# Patient Record
Sex: FEMALE | Race: BLACK OR AFRICAN AMERICAN | NOT HISPANIC OR LATINO | Employment: FULL TIME | URBAN - METROPOLITAN AREA
[De-identification: names, ages, dates, MRNs, and addresses within clinical notes are randomized per-mention and may not be internally consistent; named-entity substitution may affect disease eponyms.]

---

## 2024-04-24 DIAGNOSIS — J32.9 CHRONIC SINUSITIS: Primary | ICD-10-CM

## 2024-04-29 ENCOUNTER — OFFICE VISIT (OUTPATIENT)
Dept: OTOLARYNGOLOGY | Facility: CLINIC | Age: 39
End: 2024-04-29
Payer: COMMERCIAL

## 2024-04-29 DIAGNOSIS — R44.8 FACIAL PRESSURE: ICD-10-CM

## 2024-04-29 DIAGNOSIS — R09.82 PND (POST-NASAL DRIP): ICD-10-CM

## 2024-04-29 DIAGNOSIS — J30.9 ALLERGIC RHINITIS, UNSPECIFIED SEASONALITY, UNSPECIFIED TRIGGER: Primary | ICD-10-CM

## 2024-04-29 PROCEDURE — 99442 PR PHYSICIAN TELEPHONE EVALUATION 11-20 MIN: CPT | Mod: 95,,, | Performed by: NURSE PRACTITIONER

## 2024-04-29 PROCEDURE — 1159F MED LIST DOCD IN RCRD: CPT | Mod: CPTII,95,, | Performed by: NURSE PRACTITIONER

## 2024-04-29 RX ORDER — FLUTICASONE PROPIONATE 50 MCG
2 SPRAY, SUSPENSION (ML) NASAL 2 TIMES DAILY
Qty: 16 G | Refills: 11 | Status: SHIPPED | OUTPATIENT
Start: 2024-04-29 | End: 2024-05-29

## 2024-04-29 RX ORDER — FLUTICASONE PROPIONATE 50 MCG
2 SPRAY, SUSPENSION (ML) NASAL 2 TIMES DAILY
Qty: 16 G | Refills: 11 | Status: SHIPPED | OUTPATIENT
Start: 2024-04-29 | End: 2024-04-29 | Stop reason: SDUPTHER

## 2024-04-29 RX ORDER — AZELASTINE 1 MG/ML
1 SPRAY, METERED NASAL 2 TIMES DAILY
Qty: 30 ML | Refills: 5 | Status: SHIPPED | OUTPATIENT
Start: 2024-04-29 | End: 2025-04-29

## 2024-04-29 NOTE — PROGRESS NOTES
Audio Only Telehealth Visit     The patient location is: [facility]  The chief complaint leading to consultation is: sinus concerns  Visit type: Virtual visit with audio only (telephone)  Total time spent on visit: 20 min     The reason for the audio only service rather than synchronous audio and video virtual visit was related to technical difficulties or patient preference/necessity.     Each patient to whom I provide medical services by telemedicine is:  (1) informed of the relationship between the physician and patient and the respective role of any other health care provider with respect to management of the patient; and (2) notified that they may decline to receive medical services by telemedicine and may withdraw from such care at any time. Patient verbally consented to receive this service via voice-only telephone call.       HPI: 04/29/2024: 38 y.o. female reports having a sinus infection every month for the past 3-4 months. Denies nasal congestion. Endorses rhinorrhea and PND, pressure periorbitally and around her nose, sneezing, and itchy/watery eyes. Denies hyposmia. She has been on abx once a month for this which have not helped symptoms. Has not done nasal rinses or sprays. Has tried OTC allergy meds including xyzal and benadryl with minimal success.      Assessment and plan:  38 y.o. female with AR, ?CRS. Not medically optimized.  - NSI BID  - Follow with flonase + astelin BID (discussed technique and that astelin is available OTC if ins denies)  - RTC 6 weeks     Guerline Leija NP      This service was not originating from a related E/M service provided within the previous 7 days nor will  to an E/M service or procedure within the next 24 hours or my soonest available appointment.  Prevailing standard of care was able to be met in this audio-only visit.

## 2024-08-28 ENCOUNTER — HOSPITAL ENCOUNTER (EMERGENCY)
Facility: HOSPITAL | Age: 39
Discharge: HOME | End: 2024-08-28
Attending: EMERGENCY MEDICINE
Payer: COMMERCIAL

## 2024-08-28 VITALS
RESPIRATION RATE: 20 BRPM | TEMPERATURE: 98 F | HEART RATE: 94 BPM | OXYGEN SATURATION: 99 % | SYSTOLIC BLOOD PRESSURE: 129 MMHG | DIASTOLIC BLOOD PRESSURE: 74 MMHG | HEIGHT: 65 IN | WEIGHT: 200 LBS | BODY MASS INDEX: 33.32 KG/M2

## 2024-08-28 DIAGNOSIS — R07.9 CHEST PAIN: ICD-10-CM

## 2024-08-28 LAB
ALBUMIN SERPL-MCNC: 4.1 G/DL (ref 3.5–5)
ALBUMIN/GLOB SERPL: 1.1 RATIO (ref 1.1–2)
ALP SERPL-CCNC: 63 UNIT/L (ref 40–150)
ALT SERPL-CCNC: 12 UNIT/L (ref 0–55)
ANION GAP SERPL CALC-SCNC: 8 MEQ/L
AST SERPL-CCNC: 15 UNIT/L (ref 5–34)
BASOPHILS # BLD AUTO: 0.05 X10(3)/MCL
BASOPHILS NFR BLD AUTO: 0.9 %
BILIRUB SERPL-MCNC: 0.4 MG/DL
BNP BLD-MCNC: <10 PG/ML
BUN SERPL-MCNC: 11 MG/DL (ref 7–18.7)
CALCIUM SERPL-MCNC: 9.7 MG/DL (ref 8.4–10.2)
CHLORIDE SERPL-SCNC: 107 MMOL/L (ref 98–107)
CO2 SERPL-SCNC: 24 MMOL/L (ref 22–29)
CREAT SERPL-MCNC: 0.88 MG/DL (ref 0.55–1.02)
CREAT/UREA NIT SERPL: 13
D DIMER PPP IA.FEU-MCNC: <=0.27 UG/ML FEU (ref 0–0.5)
EOSINOPHIL # BLD AUTO: 0.17 X10(3)/MCL (ref 0–0.9)
EOSINOPHIL NFR BLD AUTO: 3 %
ERYTHROCYTE [DISTWIDTH] IN BLOOD BY AUTOMATED COUNT: 12.3 % (ref 11.5–17)
GFR SERPLBLD CREATININE-BSD FMLA CKD-EPI: >60 ML/MIN/1.73/M2
GLOBULIN SER-MCNC: 3.7 GM/DL (ref 2.4–3.5)
GLUCOSE SERPL-MCNC: 104 MG/DL (ref 74–100)
HCT VFR BLD AUTO: 37.2 % (ref 37–47)
HGB BLD-MCNC: 12.6 G/DL (ref 12–16)
IMM GRANULOCYTES # BLD AUTO: 0.02 X10(3)/MCL (ref 0–0.04)
IMM GRANULOCYTES NFR BLD AUTO: 0.3 %
INR PPP: 1 (ref 2–3)
LIPASE SERPL-CCNC: 20 U/L
LYMPHOCYTES # BLD AUTO: 1.93 X10(3)/MCL (ref 0.6–4.6)
LYMPHOCYTES NFR BLD AUTO: 33.6 %
MCH RBC QN AUTO: 30.4 PG (ref 27–31)
MCHC RBC AUTO-ENTMCNC: 33.9 G/DL (ref 33–36)
MCV RBC AUTO: 89.9 FL (ref 80–94)
MONOCYTES # BLD AUTO: 0.44 X10(3)/MCL (ref 0.1–1.3)
MONOCYTES NFR BLD AUTO: 7.7 %
NEUTROPHILS # BLD AUTO: 3.13 X10(3)/MCL (ref 2.1–9.2)
NEUTROPHILS NFR BLD AUTO: 54.5 %
NRBC BLD AUTO-RTO: 0 %
OHS QRS DURATION: 84 MS
OHS QTC CALCULATION: 467 MS
PLATELET # BLD AUTO: 323 X10(3)/MCL (ref 130–400)
PMV BLD AUTO: 9.5 FL (ref 7.4–10.4)
POTASSIUM SERPL-SCNC: 3.8 MMOL/L (ref 3.5–5.1)
PROT SERPL-MCNC: 7.8 GM/DL (ref 6.4–8.3)
PROTHROMBIN TIME: 12.9 SECONDS (ref 11.7–14.5)
RBC # BLD AUTO: 4.14 X10(6)/MCL (ref 4.2–5.4)
SODIUM SERPL-SCNC: 139 MMOL/L (ref 136–145)
TROPONIN I SERPL-MCNC: <0.01 NG/ML (ref 0–0.04)
WBC # BLD AUTO: 5.74 X10(3)/MCL (ref 4.5–11.5)

## 2024-08-28 PROCEDURE — 80053 COMPREHEN METABOLIC PANEL: CPT

## 2024-08-28 PROCEDURE — 93010 ELECTROCARDIOGRAM REPORT: CPT | Mod: ,,, | Performed by: INTERNAL MEDICINE

## 2024-08-28 PROCEDURE — 83880 ASSAY OF NATRIURETIC PEPTIDE: CPT

## 2024-08-28 PROCEDURE — 85379 FIBRIN DEGRADATION QUANT: CPT

## 2024-08-28 PROCEDURE — 93005 ELECTROCARDIOGRAM TRACING: CPT

## 2024-08-28 PROCEDURE — 25500020 PHARM REV CODE 255

## 2024-08-28 PROCEDURE — 85025 COMPLETE CBC W/AUTO DIFF WBC: CPT

## 2024-08-28 PROCEDURE — 83690 ASSAY OF LIPASE: CPT

## 2024-08-28 PROCEDURE — 99285 EMERGENCY DEPT VISIT HI MDM: CPT | Mod: 25

## 2024-08-28 PROCEDURE — 85610 PROTHROMBIN TIME: CPT

## 2024-08-28 PROCEDURE — 84484 ASSAY OF TROPONIN QUANT: CPT

## 2024-08-28 RX ORDER — KETOROLAC TROMETHAMINE 10 MG/1
10 TABLET, FILM COATED ORAL EVERY 6 HOURS PRN
Qty: 20 TABLET | Refills: 0 | Status: SHIPPED | OUTPATIENT
Start: 2024-08-28 | End: 2024-09-02

## 2024-08-28 RX ORDER — KETOROLAC TROMETHAMINE 30 MG/ML
30 INJECTION, SOLUTION INTRAMUSCULAR; INTRAVENOUS
Status: DISCONTINUED | OUTPATIENT
Start: 2024-08-28 | End: 2024-08-28 | Stop reason: HOSPADM

## 2024-08-28 RX ADMIN — IOHEXOL 100 ML: 350 INJECTION, SOLUTION INTRAVENOUS at 03:08

## 2024-08-28 NOTE — ED PROVIDER NOTES
Encounter Date: 2024       History     Chief Complaint   Patient presents with    Chest Pain     Pt c/o chest pain onset months ago. Pt denies other s/s.     39 y.o.  female presents to Emergency Department with a chief complaint of chest pain. Symptoms began several months ago and have been worsening since onset. Reports pain radiates to her RUQ.  Associated symptoms include none. Symptoms are aggravated with palpation and there are no alleviating factors. The patient denies SOB, wheezing, fever, chills, vomiting, or syncope. No other reported symptoms at this time      The history is provided by the patient. No  was used.   Chest Pain  Illness onset: several months. The chest pain is unchanged. The pain radiates to the epigastrium. Pertinent negatives for primary symptoms include no fever, no fatigue, no shortness of breath, no cough, no wheezing, no palpitations, no nausea and no vomiting. She tried nothing for the symptoms.   Her past medical history is significant for PE.     Review of patient's allergies indicates:  No Known Allergies  Past Medical History:   Diagnosis Date    Endometriosis     Other pulmonary embolism without acute cor pulmonale     Pelvic adhesions     PID (pelvic inflammatory disease)      Past Surgical History:   Procedure Laterality Date     SECTION      dx scope  2013    HYSTERECTOMY  2014    DARLENE with ovarian preservation    HYSTEROSCOPY      VAGINAL CUFF       Family History   Problem Relation Name Age of Onset    No Known Problems Mother      No Known Problems Father       Social History     Tobacco Use    Smoking status: Never    Smokeless tobacco: Never   Substance Use Topics    Alcohol use: Yes     Alcohol/week: 0.0 standard drinks of alcohol     Comment: occasionally    Drug use: No     Review of Systems   Constitutional:  Negative for chills, fatigue and fever.   Respiratory:  Negative for cough, shortness of breath and  wheezing.    Cardiovascular:  Positive for chest pain. Negative for palpitations and leg swelling.   Gastrointestinal:  Negative for blood in stool, constipation, nausea and vomiting.   Musculoskeletal:  Negative for back pain, gait problem and joint swelling.   All other systems reviewed and are negative.      Physical Exam     Initial Vitals [08/28/24 1310]   BP Pulse Resp Temp SpO2   129/74 94 20 98.1 °F (36.7 °C) 99 %      MAP       --         Physical Exam    Nursing note and vitals reviewed.  Constitutional: She appears well-developed and well-nourished. She is not diaphoretic. She is cooperative.  Non-toxic appearance. No distress.   HENT:   Head: Normocephalic and atraumatic.   Right Ear: External ear normal.   Left Ear: External ear normal.   Nose: Nose normal.   Eyes: Conjunctivae and EOM are normal. Pupils are equal, round, and reactive to light.   Neck: Neck supple.   Normal range of motion.  Cardiovascular:  Regular rhythm, S1 normal, S2 normal, normal heart sounds, intact distal pulses and normal pulses.           Pulmonary/Chest: Effort normal and breath sounds normal. No tachypnea and no bradypnea. No respiratory distress. She has no decreased breath sounds. She has no wheezes. She has no rhonchi. She has no rales. She exhibits tenderness.     Tenderness noted to outlined area.    Abdominal: Abdomen is soft. Bowel sounds are normal. She exhibits no distension. There is no rebound and no guarding.   Musculoskeletal:         General: Normal range of motion.      Cervical back: Normal range of motion and neck supple.     Neurological: She is alert and oriented to person, place, and time. She has normal strength. No sensory deficit. GCS score is 15. GCS eye subscore is 4. GCS verbal subscore is 5. GCS motor subscore is 6.   Skin: Skin is warm and dry. Capillary refill takes less than 2 seconds.   Psychiatric: She has a normal mood and affect. Thought content normal.         ED Course   Procedures  Labs  Reviewed   COMPREHENSIVE METABOLIC PANEL - Abnormal       Result Value    Sodium 139      Potassium 3.8      Chloride 107      CO2 24      Glucose 104 (*)     Blood Urea Nitrogen 11.0      Creatinine 0.88      Calcium 9.7      Protein Total 7.8      Albumin 4.1      Globulin 3.7 (*)     Albumin/Globulin Ratio 1.1      Bilirubin Total 0.4      ALP 63      ALT 12      AST 15      eGFR >60      Anion Gap 8.0      BUN/Creatinine Ratio 13     PROTIME-INR - Abnormal    PT 12.9      INR 1.0 (*)    CBC WITH DIFFERENTIAL - Abnormal    WBC 5.74      RBC 4.14 (*)     Hgb 12.6      Hct 37.2      MCV 89.9      MCH 30.4      MCHC 33.9      RDW 12.3      Platelet 323      MPV 9.5      Neut % 54.5      Lymph % 33.6      Mono % 7.7      Eos % 3.0      Basophil % 0.9      Lymph # 1.93      Neut # 3.13      Mono # 0.44      Eos # 0.17      Baso # 0.05      IG# 0.02      IG% 0.3      NRBC% 0.0     TROPONIN I - Normal    Troponin-I <0.010     B-TYPE NATRIURETIC PEPTIDE - Normal    Natriuretic Peptide <10.0     LIPASE - Normal    Lipase Level 20     D DIMER, QUANTITATIVE - Normal    D-Dimer <=0.27     CBC W/ AUTO DIFFERENTIAL    Narrative:     The following orders were created for panel order CBC auto differential.  Procedure                               Abnormality         Status                     ---------                               -----------         ------                     CBC with Differential[[id number]]       Abnormal            Final result                 Please view results for these tests on the individual orders.     EKG Readings: (Independently Interpreted)   Initial Reading: No STEMI. Rhythm: Normal Sinus Rhythm. Heart Rate: 94. Ectopy: No Ectopy. Conduction: Normal. ST Segments: Normal ST Segments. T Waves: Normal. Clinical Impression: Normal Sinus Rhythm     ECG Results              EKG 12-lead (Final result)        Collection Time Result Time QRS Duration OHS QTC Calculation    08/28/24 13:18:32 08/28/24  15:45:58 84 467                     Final result by Interface, Lab In [facility] (08/28/24 15:46:29)                   Narrative:    Test Reason : R07.9,    Vent. Rate : 094 BPM     Atrial Rate : 094 BPM     P-R Int : 134 ms          QRS Dur : 084 ms      QT Int : 374 ms       P-R-T Axes : 045 056 054 degrees     QTc Int : 467 ms    Normal sinus rhythm  Normal ECG  No previous ECGs available  Confirmed by Diogo De Jesus MD ([id number]) on 8/28/2024 3:45:56 PM    Referred By: [email or web address]   SELF           Confirmed By:Diogo De Jesus MD                                  Imaging Results              CTA Chest Non-Coronary (PE Studies) (Final result)  Result time 08/28/24 16:03:14      Final result by Marcelo Alejandre MD (08/28/24 16:03:14)                   Impression:      Negative for pulmonary thromboembolic disease.  No acute findings in the chest.      Electronically signed by: Marcelo Alejandre  Date:    08/28/2024  Time:    16:03               Narrative:    EXAMINATION:  CTA CHEST NON CORONARY (PE STUDIES)    CLINICAL HISTORY:  Pulmonary embolism (PE) suspected, neg D-dimer;    TECHNIQUE:  Helical acquisition through the chest with IV contrast targeting the pulmonary arteries. Multiplanar and 3D MIP reconstructed images were provided for review.  mGycm. Automatic exposure control, adjustment of mA/kV or iterative reconstruction technique was used to reduce radiation.    COMPARISON:  None available.    FINDINGS:  There is good opacification of the pulmonary arterial tree. There is no evidence of pulmonary thromboembolism.  There is no aortic dissection.    There is no mediastinal, hilar or axillary lymphadenopathy by size criteria.    Heart is normal in size. No pericardial effusion.    There is no pneumothorax or pleural effusion.  No significant abnormality of the lung parenchyma.    There are no acute findings in the imaged upper abdomen.  There are no acute osseous findings.                                       X-Ray Chest  PA And Lateral (Final result)  Result time 08/28/24 14:03:37      Final result by Marcelo Alejandre MD (08/28/24 14:03:37)                   Impression:      No acute cardiopulmonary abnormality.      Electronically signed by: Marcelo Alejandre  Date:    08/28/2024  Time:    14:03               Narrative:    EXAMINATION:  XR CHEST PA AND LATERAL    CLINICAL HISTORY:  Chest pain, unspecified    COMPARISON:  22 August 2019    FINDINGS:  PA and lateral views of the chest were obtained. Heart and mediastinum within normal limits. The lungs are clear. No pneumothorax or significant effusion.                                       Medications   ketorolac injection 30 mg (30 mg Intravenous Incomplete 8/28/24 1648)   iohexoL (OMNIPAQUE 350) injection 100 mL (100 mLs Intravenous Given 8/28/24 1517)     Medical Decision Making  Patient awake, alert, has non-labored breathing, and follows commands appropriately. Arrived to ED due to chest pain that radiates to her RUQ that began several months ago and has been worsening since onset. Afebrile. NAD noted. Denies n/v/d.       Judging by the patient's chief complaint and pertinent history, the patient has the following possible differential diagnoses, including but not limited to the following: Abdominal Pain, GERD, Pancreatitis, PE, Chest Pain    Some of these are deemed to be lower likelihood and some more likely based on my physical exam and history combined with possible lab work and/or imaging studies. Please see the pertinent studies, and refer to the HPI. Some of these diagnoses will take further evaluation to fully rule out, perhaps as an outpatient and the patient was encouraged to follow up when discharged for more comprehensive evaluation.       Amount and/or Complexity of Data Reviewed  Labs: ordered.     Details: No leukocytosis. Troponin level negative. Informed patient of results.   Radiology: ordered. Decision-making details documented in ED Course.     Details: Informed  patient of results.   ECG/medicine tests: ordered.  Discussion of management or test interpretation with external provider(s): Patient stable for discharge home. Resources for cardiology provided in paperwork. To f/u on tomorrow with PCP and cardiology. Discussed plan of care and interventions with patient. Agreed to and aware of plan of care. Comfortable being discharged home. Patient discharged home. Patient denies new or additional complaints; no further tests indicated at this time. Verbalized understanding of instructions. No emergent or apparent distress noted prior to discharge. To follow up with PCP in 1 week as needed. Strict ER return precautions given.       Risk  OTC drugs.  Prescription drug management.               ED Course as of 08/28/24 1704   Wed Aug 28, 2024   1414 X-Ray Chest PA And Lateral  PA and lateral views of the chest were obtained. Heart and mediastinum within normal limits. The lungs are clear. No pneumothorax or significant effusion. [JA]   1458 Patient reports hx of PE several years ago. Denies BT use currently. Reports when she had a PE years ago she had the same pain. Will obtain CTA to assess for PE.  [JA]   1611 CTA Chest Non-Coronary (PE Studies)  Negative for pulmonary thromboembolic disease.  No acute findings in the chest. [JA]   1648 Patient's workup unremarkable on today. Instructed patient f/u with PCP on tomorrow regarding complaints since they have been recurrent for several months. Will prescribe medications for pain. Cautioned on side effects. At disposition, patient's VSS, has no additional complaints, neurologically intact, and workup unremarkable. Discussed with Dr. Delacruz who agrees with plan of care. Patient comfortable with plan of care and being discharged home. Strict ER return precautions given.  [JA]      ED Course User Index  [JA] Ame Jackman, NP                           Clinical Impression:  Final diagnoses:  [R07.9] Chest pain          ED  Disposition Condition    Discharge Stable          ED Prescriptions       Medication Sig Dispense Start Date End Date Auth. Provider    ketorolac (TORADOL) 10 mg tablet Take 1 tablet (10 mg total) by mouth every 6 (six) hours as needed for Pain. 20 tablet 8/28/2024 9/2/2024 Ame Jackman, ELEANOR          Follow-up Information       Follow up With Specialties Details Why Contact Info    Ba Cervantes MD Family Medicine Call in 1 day If symptoms worsen, As needed [address]  [facility] [address] [phone]      [facility] - Emergency Dept Emergency Medicine Go to  If symptoms worsen, As needed [address]  [facility] [address] [phone]    Lexx Vivas MD Cardiology Call in 1 day If symptoms worsen, As needed [address] [facility] [id number] [phone]               Ame Jackman NP  08/28/24 [id number]

## 2024-08-28 NOTE — Clinical Note
"Velma"Jessy Gordon was seen and treated in our emergency department on 8/28/2024.  She may return to work on 08/29/2024.       If you have any questions or concerns, please don't hesitate to call.      Scar Delacruz MD"

## 2024-10-09 NOTE — PROGRESS NOTES
[facility]  Otolaryngology Clinic Note    Velma [facility]  YOB: 1985    Chief Complaint:   Chief Complaint   Patient presents with    Follow-up        HPI:  2024: 38 y.o. female reports having a sinus infection every month for the past 3-4 months. Denies nasal congestion. Endorses rhinorrhea and PND, pressure periorbitally and around her nose, sneezing, and itchy/watery eyes. Denies hyposmia. She has been on abx once a month for this which have not helped symptoms. Has not done nasal rinses or sprays. Has tried OTC allergy meds including xyzal and benadryl with minimal success.      10/10/2024: No change in symptoms, however, admits she has not done rinses or sprays. States she felt that she was drowning herself with the rinses and that nasal sprays went straight down her throat and hurt her stomach. Endorses loud snoring, apnea symptoms, & EDS.    ROS:   10-point review of systems negative except per HPI      Review of patient's allergies indicates:  No Known Allergies    Past Medical History:   Diagnosis Date    Endometriosis     Other pulmonary embolism without acute cor pulmonale     Pelvic adhesions     PID (pelvic inflammatory disease)        Past Surgical History:   Procedure Laterality Date     SECTION      CHOLECYSTECTOMY  2018    dx scope  2013    HYSTERECTOMY  2014    DARLENE with ovarian preservation    HYSTEROSCOPY      VAGINAL CUFF         Social History     Socioeconomic History    Marital status: Single   Tobacco Use    Smoking status: Never    Smokeless tobacco: Never   Substance and Sexual Activity    Alcohol use: Yes     Comment: occasionally    Drug use: Never    Sexual activity: Yes     Partners: Male     Birth control/protection: None       Family History   Problem Relation Name Age of Onset    Diabetes Mother Jody     Hypertension Mother Jody     Hypertension Father Ryan     Stroke Paternal Grandmother Valerie         Outpatient Encounter Medications as of 10/10/2024   Medication Sig Dispense Refill    azelastine (ASTELIN) 137 mcg (0.1 %) nasal spray 1 spray (137 mcg total) by Nasal route 2 (two) times daily. (Patient not taking: Reported on 10/10/2024) 30 mL 5    desogestrel-ethinyl estradiol (DESOGEN) 0.15-0.03 mg per tablet Take 1 tablet by mouth once daily. Skip placebo 56 tablet 12    estradiol (ESTRACE) 0.01 % (0.1 mg/gram) vaginal cream Place 1 g vaginally every Mon, Wed, Fri. (Patient not taking: Reported on 4/29/2024) 12 g 11    miconazole NITRATE 2 % (MICOTIN) 2 % top powder Apply topically as needed for Itching. (Patient not taking: Reported on 4/29/2024) 85 g 2    naproxen sodium (ANAPROX) 550 MG tablet Take 1 tablet (550 mg total) by mouth every 12 (twelve) hours as needed (cramping). (Patient not taking: Reported on 4/29/2024) 30 tablet 0    oxycodone-acetaminophen (PERCOCET) 5-325 mg per tablet Take 1 tablet by mouth every 4 (four) hours as needed for Pain. (Patient not taking: Reported on 4/29/2024) 15 tablet 0     No facility-administered encounter medications on file as of 10/10/2024.       Physical Exam:  Vitals:    10/10/24 0930   BP: 111/75   Pulse: 90   Temp: 98.1 °F (36.7 °C)   TempSrc: Oral       Physical Exam   General: NAD, voice normal  Neuro: AAO, CN II - XII grossly intact  Head/ Face: NCAT, symmetric, sensations intact bilaterally  Eyes: EOMI, PERRL  Ears: externally normal with grossly normal hearing  AD: EAC patent, TM intact, no middle ear effusion, no retractions  AS: EAC patent, TM intact, no middle ear effusion, no retractions  Nose: bilateral nares patent, midline septum, no rhinorrhea, no external deformity, mild turbinate hypertrophy  OC/OP: MMM, no intraoral lesions, no trismus, dentition is good, no uvular deviation, bilaterally symmetric soft palate elevation, palatoglossus and palatopharyngeal fold wnl; tonsils are symmetric and 1+  Indirect laryngoscopy: deferred due to patient  intolerance  Neck: soft, supple, no LAD, normal ROM, no thyromegaly  Respiratory: nonlabored, no wheezing, bilateral chest rise  Cardiovascular: RRR  Gastrointestinal: S NT ND  Skin: warm, no lesions  Musculoskeletal: 5/5 strength  Psych: Appropriate affect/mood     Pertinent Data:  ? LABS:  ? AUDIO:           ? PATH:      Imaging:   I personally reviewed the following images:        Assessment/Plan:  39 y.o. female with AR, ?CRS. Not medically optimized.  - NSI BID (reviewed technique)  - Follow with flonase + astelin BID (reviewed technique)  - RTC 3-4 mo. Ok for telemed.     Guerline Leija NP

## 2024-10-10 ENCOUNTER — OFFICE VISIT (OUTPATIENT)
Dept: OTOLARYNGOLOGY | Facility: CLINIC | Age: 39
End: 2024-10-10
Payer: COMMERCIAL

## 2024-10-10 VITALS — TEMPERATURE: 98 F | DIASTOLIC BLOOD PRESSURE: 75 MMHG | HEART RATE: 90 BPM | SYSTOLIC BLOOD PRESSURE: 111 MMHG

## 2024-10-10 DIAGNOSIS — G47.30 SLEEP DISORDER BREATHING: ICD-10-CM

## 2024-10-10 DIAGNOSIS — R09.82 PND (POST-NASAL DRIP): ICD-10-CM

## 2024-10-10 DIAGNOSIS — R44.8 FACIAL PRESSURE: ICD-10-CM

## 2024-10-10 DIAGNOSIS — J30.9 ALLERGIC RHINITIS, UNSPECIFIED SEASONALITY, UNSPECIFIED TRIGGER: Primary | ICD-10-CM

## 2024-10-10 PROCEDURE — 99213 OFFICE O/P EST LOW 20 MIN: CPT | Mod: PBBFAC | Performed by: NURSE PRACTITIONER

## 2024-10-10 PROCEDURE — 3078F DIAST BP <80 MM HG: CPT | Mod: CPTII,,, | Performed by: NURSE PRACTITIONER

## 2024-10-10 PROCEDURE — 99213 OFFICE O/P EST LOW 20 MIN: CPT | Mod: S$PBB,,, | Performed by: NURSE PRACTITIONER

## 2024-10-10 PROCEDURE — 3074F SYST BP LT 130 MM HG: CPT | Mod: CPTII,,, | Performed by: NURSE PRACTITIONER

## 2024-10-10 PROCEDURE — 1159F MED LIST DOCD IN RCRD: CPT | Mod: CPTII,,, | Performed by: NURSE PRACTITIONER

## 2024-10-10 RX ORDER — FLUTICASONE PROPIONATE 50 MCG
1 SPRAY, SUSPENSION (ML) NASAL 2 TIMES DAILY
Qty: 16 G | Refills: 11 | Status: SHIPPED | OUTPATIENT
Start: 2024-10-10 | End: 2024-11-09

## 2024-10-10 RX ORDER — AZELASTINE 1 MG/ML
1 SPRAY, METERED NASAL 2 TIMES DAILY
Qty: 30 ML | Refills: 5 | Status: SHIPPED | OUTPATIENT
Start: 2024-10-10 | End: 2025-10-10

## 2025-01-10 ENCOUNTER — OFFICE VISIT (OUTPATIENT)
Dept: OTOLARYNGOLOGY | Facility: CLINIC | Age: 40
End: 2025-01-10
Payer: COMMERCIAL

## 2025-01-10 DIAGNOSIS — R09.82 PND (POST-NASAL DRIP): ICD-10-CM

## 2025-01-10 DIAGNOSIS — G47.30 SLEEP DISORDER BREATHING: ICD-10-CM

## 2025-01-10 DIAGNOSIS — J30.9 ALLERGIC RHINITIS, UNSPECIFIED SEASONALITY, UNSPECIFIED TRIGGER: Primary | ICD-10-CM

## 2025-01-10 DIAGNOSIS — R44.8 FACIAL PRESSURE: ICD-10-CM

## 2025-01-10 RX ORDER — AZELASTINE 1 MG/ML
1 SPRAY, METERED NASAL 2 TIMES DAILY
Qty: 90 ML | Refills: 3 | Status: SHIPPED | OUTPATIENT
Start: 2025-01-10 | End: 2026-01-10

## 2025-01-10 RX ORDER — ZOLMITRIPTAN 5 MG/1
TABLET, FILM COATED ORAL
COMMUNITY
Start: 2024-05-15

## 2025-01-10 NOTE — PROGRESS NOTES
Audio Only Telehealth Visit     The patient [facility]  The chief complaint leading to consultation is: f/u  Visit type: Virtual visit with audio only (telephone)  Total time spent on visit: 15 min     The reason for the audio only service rather than synchronous audio and video virtual visit was related to technical difficulties or patient preference/necessity.     Each patient to whom I provide medical services by telemedicine is:  (1) informed of the relationship between the physician and patient and the respective role of any other health care provider with respect to management of the patient; and (2) notified that they may decline to receive medical services by telemedicine and may withdraw from such care at any time. Patient verbally consented to receive this service via voice-only telephone call.       HPI:  04/29/2024: 38 y.o. female reports having a sinus infection every month for the past 3-4 months. Denies nasal congestion. Endorses rhinorrhea and PND, pressure periorbitally and around her nose, sneezing, and itchy/watery eyes. Denies hyposmia. She has been on abx once a month for this which have not helped symptoms. Has not done nasal rinses or sprays. Has tried OTC allergy meds including xyzal and benadryl with minimal success.      10/10/2024: No change in symptoms, however, admits she has not done rinses or sprays. States she felt that she was drowning herself with the rinses and that nasal sprays went straight down her throat and hurt her stomach. Endorses loud snoring, apnea symptoms, & EDS.    01/10/2025: She is using astelin and has been feeling good. No nasal c/o today. States she did not receive correspondence that sleep lab had called to schedule her sleep study. She is still interested in having this done as apnea symptoms are unchanged.     Assessment and plan:  39 y.o. female with AR, sleep d/o breathing.  - Continue astelin BID   - PSG (provided with # to call & schedule)  -  RTC PRN    Guerline Leija NP      This service was not originating from a related E/M service provided within the previous 7 days nor will  to an E/M service or procedure within the next 24 hours or my soonest available appointment.  Prevailing standard of care was able to be met in this audio-only visit.

## 2025-01-16 ENCOUNTER — TELEPHONE (OUTPATIENT)
Dept: TRANSPLANT | Facility: CLINIC | Age: 40
End: 2025-01-16
Payer: COMMERCIAL

## 2025-01-16 NOTE — TELEPHONE ENCOUNTER
----- Message from [employer] sent at 1/16/2025 10:31 AM CST -----    Hepatology referral received and scanned into media; pt chart sent to referral nurse for medical review.       Referring Provider: MARK Prieto MD  Phone: [phone]  Fax: [phone]  .

## 2025-01-22 ENCOUNTER — TELEPHONE (OUTPATIENT)
Dept: TRANSPLANT | Facility: CLINIC | Age: 40
End: 2025-01-22
Payer: COMMERCIAL

## 2025-01-22 NOTE — LETTER
January 22, 2025    Velma Gordon  [address] [place] [address] [address] [id number]      Dear Velma Gordon:    Your doctor has referred you to the [facility]. We are sending this letter to remind you to make an appointment with us to complete the referral process.     Please call us at [phone] to schedule an appointment. We look forward to seeing you soon.     If you received a call and have been scheduled, please disregard this letter.       Sincerely,        [facility]   [address] [place] [id number]  [phone]

## 2025-01-23 NOTE — TELEPHONE ENCOUNTER
Pt records reviewed.  Pt will be referred to Hepatology due to   Initial referral received  from Referring Provider: MARK Prieto MD   Phone: [phone]   Fax: [phone]   Referral letter sent to patient.    Need CD from [employer] [phone]/fax [phone]  9/9/2024      RECORDS SCANNED IN MEDIA UNDER HEPATOLOGY REFERRAL .

## 2025-01-31 ENCOUNTER — TELEPHONE (OUTPATIENT)
Dept: TRANSPLANT | Facility: CLINIC | Age: 40
End: 2025-01-31
Payer: COMMERCIAL

## 2025-01-31 NOTE — TELEPHONE ENCOUNTER
----- Message from Maria Fernanda sent at 1/31/2025  2:02 PM CST -----  Regarding: FW: need CD     Request submitted via [employer]  .  ----- Message -----  From: Daja Stanley  Sent: 1/22/2025   7:55 PM CST  To: Maria Fernanda Chavez  Subject: need CD                                          Need CD from [employer] [phone]/fax [phone]    9/9/2024    I am sending to [facility]

## 2025-02-03 ENCOUNTER — DOCUMENTATION ONLY (OUTPATIENT)
Dept: TRANSPLANT | Facility: CLINIC | Age: 40
End: 2025-02-03
Payer: COMMERCIAL

## 2025-02-26 ENCOUNTER — HOSPITAL ENCOUNTER (EMERGENCY)
Facility: HOSPITAL | Age: 40
Discharge: HOME | End: 2025-02-26
Attending: EMERGENCY MEDICINE
Payer: COMMERCIAL

## 2025-02-26 VITALS
RESPIRATION RATE: 14 BRPM | TEMPERATURE: 97 F | BODY MASS INDEX: 30.82 KG/M2 | DIASTOLIC BLOOD PRESSURE: 90 MMHG | OXYGEN SATURATION: 100 % | HEIGHT: 65 IN | SYSTOLIC BLOOD PRESSURE: 140 MMHG | WEIGHT: 185 LBS | HEART RATE: 93 BPM

## 2025-02-26 DIAGNOSIS — N21.9 CALCULUS OF LOWER URINARY TRACT: Primary | ICD-10-CM

## 2025-02-26 LAB
ALBUMIN SERPL-MCNC: 4.4 G/DL (ref 3.5–5)
ALBUMIN/GLOB SERPL: 1.3 RATIO (ref 1.1–2)
ALP SERPL-CCNC: 63 UNIT/L (ref 40–150)
ALT SERPL-CCNC: 10 UNIT/L (ref 0–55)
ANION GAP SERPL CALC-SCNC: 13 MEQ/L
AST SERPL-CCNC: 13 UNIT/L (ref 5–34)
BACTERIA #/AREA URNS AUTO: ABNORMAL /HPF
BASOPHILS # BLD AUTO: 0.05 X10(3)/MCL
BASOPHILS NFR BLD AUTO: 0.9 %
BILIRUB SERPL-MCNC: 0.5 MG/DL
BILIRUB UR QL STRIP.AUTO: NEGATIVE
BUN SERPL-MCNC: 13.7 MG/DL (ref 7–18.7)
CALCIUM SERPL-MCNC: 9.6 MG/DL (ref 8.4–10.2)
CHLORIDE SERPL-SCNC: 105 MMOL/L (ref 98–107)
CLARITY UR: CLEAR
CO2 SERPL-SCNC: 23 MMOL/L (ref 22–29)
COLOR UR AUTO: ABNORMAL
CREAT SERPL-MCNC: 1.03 MG/DL (ref 0.55–1.02)
CREAT/UREA NIT SERPL: 13
EOSINOPHIL # BLD AUTO: 0.42 X10(3)/MCL (ref 0–0.9)
EOSINOPHIL NFR BLD AUTO: 7.5 %
ERYTHROCYTE [DISTWIDTH] IN BLOOD BY AUTOMATED COUNT: 11.9 % (ref 11.5–17)
GFR SERPLBLD CREATININE-BSD FMLA CKD-EPI: >60 ML/MIN/1.73/M2
GLOBULIN SER-MCNC: 3.3 GM/DL (ref 2.4–3.5)
GLUCOSE SERPL-MCNC: 92 MG/DL (ref 74–100)
GLUCOSE UR QL STRIP: NORMAL
HCT VFR BLD AUTO: 38.4 % (ref 37–47)
HGB BLD-MCNC: 13.2 G/DL (ref 12–16)
HGB UR QL STRIP: NEGATIVE
IMM GRANULOCYTES # BLD AUTO: 0.01 X10(3)/MCL (ref 0–0.04)
IMM GRANULOCYTES NFR BLD AUTO: 0.2 %
KETONES UR QL STRIP: NEGATIVE
LEUKOCYTE ESTERASE UR QL STRIP: NEGATIVE
LYMPHOCYTES # BLD AUTO: 2.02 X10(3)/MCL (ref 0.6–4.6)
LYMPHOCYTES NFR BLD AUTO: 36 %
MCH RBC QN AUTO: 30.6 PG (ref 27–31)
MCHC RBC AUTO-ENTMCNC: 34.4 G/DL (ref 33–36)
MCV RBC AUTO: 88.9 FL (ref 80–94)
MONOCYTES # BLD AUTO: 0.33 X10(3)/MCL (ref 0.1–1.3)
MONOCYTES NFR BLD AUTO: 5.9 %
MUCOUS THREADS URNS QL MICRO: ABNORMAL /LPF
NEUTROPHILS # BLD AUTO: 2.78 X10(3)/MCL (ref 2.1–9.2)
NEUTROPHILS NFR BLD AUTO: 49.5 %
NITRITE UR QL STRIP: NEGATIVE
NRBC BLD AUTO-RTO: 0 %
PH UR STRIP: 7.5 [PH]
PLATELET # BLD AUTO: 331 X10(3)/MCL (ref 130–400)
PMV BLD AUTO: 9.3 FL (ref 7.4–10.4)
POTASSIUM SERPL-SCNC: 3.8 MMOL/L (ref 3.5–5.1)
PROT SERPL-MCNC: 7.7 GM/DL (ref 6.4–8.3)
PROT UR QL STRIP: NEGATIVE
RBC # BLD AUTO: 4.32 X10(6)/MCL (ref 4.2–5.4)
RBC #/AREA URNS AUTO: ABNORMAL /HPF
SODIUM SERPL-SCNC: 141 MMOL/L (ref 136–145)
SP GR UR STRIP.AUTO: 1.01 (ref 1–1.03)
SQUAMOUS #/AREA URNS LPF: ABNORMAL /HPF
UROBILINOGEN UR STRIP-ACNC: NORMAL
WBC # BLD AUTO: 5.61 X10(3)/MCL (ref 4.5–11.5)
WBC #/AREA URNS AUTO: ABNORMAL /HPF

## 2025-02-26 PROCEDURE — 96375 TX/PRO/DX INJ NEW DRUG ADDON: CPT

## 2025-02-26 PROCEDURE — 85025 COMPLETE CBC W/AUTO DIFF WBC: CPT

## 2025-02-26 PROCEDURE — 63600175 PHARM REV CODE 636 W HCPCS: Performed by: EMERGENCY MEDICINE

## 2025-02-26 PROCEDURE — 25500020 PHARM REV CODE 255: Performed by: EMERGENCY MEDICINE

## 2025-02-26 PROCEDURE — 81015 MICROSCOPIC EXAM OF URINE: CPT

## 2025-02-26 PROCEDURE — 99285 EMERGENCY DEPT VISIT HI MDM: CPT | Mod: 25

## 2025-02-26 PROCEDURE — 96374 THER/PROPH/DIAG INJ IV PUSH: CPT

## 2025-02-26 PROCEDURE — 96376 TX/PRO/DX INJ SAME DRUG ADON: CPT

## 2025-02-26 PROCEDURE — 96361 HYDRATE IV INFUSION ADD-ON: CPT

## 2025-02-26 PROCEDURE — 80053 COMPREHEN METABOLIC PANEL: CPT

## 2025-02-26 RX ORDER — OXYCODONE AND ACETAMINOPHEN 5; 325 MG/1; MG/1
1 TABLET ORAL EVERY 4 HOURS PRN
Qty: 20 TABLET | Refills: 0 | Status: SHIPPED | OUTPATIENT
Start: 2025-02-26

## 2025-02-26 RX ORDER — MORPHINE SULFATE 4 MG/ML
4 INJECTION, SOLUTION INTRAMUSCULAR; INTRAVENOUS
Refills: 0 | Status: DISCONTINUED | OUTPATIENT
Start: 2025-02-26 | End: 2025-02-26 | Stop reason: HOSPADM

## 2025-02-26 RX ORDER — MORPHINE SULFATE 4 MG/ML
4 INJECTION, SOLUTION INTRAMUSCULAR; INTRAVENOUS
Refills: 0 | Status: COMPLETED | OUTPATIENT
Start: 2025-02-26 | End: 2025-02-26

## 2025-02-26 RX ORDER — MORPHINE SULFATE 4 MG/ML
4 INJECTION, SOLUTION INTRAMUSCULAR; INTRAVENOUS
Status: COMPLETED | OUTPATIENT
Start: 2025-02-26 | End: 2025-02-26

## 2025-02-26 RX ORDER — TAMSULOSIN HYDROCHLORIDE 0.4 MG/1
0.4 CAPSULE ORAL DAILY
Qty: 7 CAPSULE | Refills: 0 | Status: SHIPPED | OUTPATIENT
Start: 2025-02-26 | End: 2025-03-05

## 2025-02-26 RX ORDER — KETOROLAC TROMETHAMINE 30 MG/ML
30 INJECTION, SOLUTION INTRAMUSCULAR; INTRAVENOUS
Status: COMPLETED | OUTPATIENT
Start: 2025-02-26 | End: 2025-02-26

## 2025-02-26 RX ORDER — ONDANSETRON 4 MG/1
4 TABLET, FILM COATED ORAL EVERY 6 HOURS
Qty: 12 TABLET | Refills: 0 | Status: SHIPPED | OUTPATIENT
Start: 2025-02-26

## 2025-02-26 RX ORDER — ONDANSETRON HYDROCHLORIDE 2 MG/ML
4 INJECTION, SOLUTION INTRAVENOUS
Status: COMPLETED | OUTPATIENT
Start: 2025-02-26 | End: 2025-02-26

## 2025-02-26 RX ADMIN — MORPHINE SULFATE 4 MG: 4 INJECTION, SOLUTION INTRAMUSCULAR; INTRAVENOUS at 12:02

## 2025-02-26 RX ADMIN — SODIUM CHLORIDE, POTASSIUM CHLORIDE, SODIUM LACTATE AND CALCIUM CHLORIDE 1000 ML: 600; 310; 30; 20 INJECTION, SOLUTION INTRAVENOUS at 10:02

## 2025-02-26 RX ADMIN — MORPHINE SULFATE 4 MG: 4 INJECTION INTRAVENOUS at 10:02

## 2025-02-26 RX ADMIN — KETOROLAC TROMETHAMINE 30 MG: 30 INJECTION, SOLUTION INTRAMUSCULAR at 10:02

## 2025-02-26 RX ADMIN — ONDANSETRON 4 MG: 2 INJECTION INTRAMUSCULAR; INTRAVENOUS at 10:02

## 2025-02-26 RX ADMIN — IOHEXOL 90 ML: 350 INJECTION, SOLUTION INTRAVENOUS at 12:02

## 2025-02-26 NOTE — ED PROVIDER NOTES
Encounter Date: 2025    SCRIBE #1 NOTE: I, Tiburcio Burt, am scribing for, and in the presence of,  Jonnie Gonzalez III, MD. I have scribed the following portions of the note - Other sections scribed: HPI, ROS, PE.       History     Chief Complaint   Patient presents with    Abdominal Pain     RLQ abd pain started last night with nausea     Patient is a 39 y.o. female with a PMHx of endometriosis,  presenting to the ED with a complaint of abdominal pain that onset last night. Patient reports she had a gradual onset of RLQ abdominal pain last night that has increased significantly this morning. Patient endorses some associated nausea and slight constipation but denies any vomiting, hematuria, or dysuria. Patient adds that she was really gassy prior to the onset of her pain last night. Patient has a surgical hx of a cholecystectomy, hysterectomy, and 2  sections. She has been taking ibuprofen for her pain.     The history is provided by the patient. No  was used.     Review of patient's allergies indicates:  No Known Allergies  Past Medical History:   Diagnosis Date    Endometriosis     Other pulmonary embolism without acute cor pulmonale     Pelvic adhesions     PID (pelvic inflammatory disease)      Past Surgical History:   Procedure Laterality Date     SECTION      CHOLECYSTECTOMY      dx scope  2013    HYSTERECTOMY  2014    DARLENE with ovarian preservation    HYSTEROSCOPY      VAGINAL CUFF       Family History   Problem Relation Name Age of Onset    Diabetes Mother Jody     Hypertension Mother Jody     Hypertension Father Ryan     Stroke Paternal Grandmother Valerie      Social History[1]  Review of Systems   Constitutional:  Negative for fatigue, fever and unexpected weight change.   HENT:  Negative for congestion and rhinorrhea.    Eyes:  Negative for pain.   Respiratory:  Negative for chest tightness, shortness of breath and wheezing.    Cardiovascular:   Negative for chest pain.   Gastrointestinal:  Positive for abdominal pain, constipation and nausea. Negative for diarrhea and vomiting.   Genitourinary:  Negative for dysuria and hematuria.   Musculoskeletal:  Negative for back pain and neck pain.   Skin:  Negative for rash.   Allergic/Immunologic: Negative for environmental allergies, food allergies and immunocompromised state.   Neurological:  Negative for dizziness and speech difficulty.   Hematological:  Does not bruise/bleed easily.   Psychiatric/Behavioral:  Negative for sleep disturbance and suicidal ideas.        Physical Exam     Initial Vitals [02/26/25 0952]   BP Pulse Resp Temp SpO2   (!) 143/93 99 20 97.2 °F (36.2 °C) 98 %      MAP       --         Physical Exam    Nursing note and vitals reviewed.  Constitutional:   Appears to be in moderate discomfort   HENT:   Head: Normocephalic and atraumatic.   Neck: Trachea normal.   Cardiovascular:  Normal rate and regular rhythm.           No murmur heard.  Pulmonary/Chest: Breath sounds normal. No respiratory distress.   Abdominal: Abdomen is soft. Bowel sounds are normal. She exhibits no distension.   RLQ pain  There is guarding.   Musculoskeletal:         General: Normal range of motion.      Lumbar back: Normal range of motion.     Neurological: She is alert and oriented to person, place, and time. She has normal strength. No cranial nerve deficit.   Skin: Skin is warm and dry. No rash noted.   Psychiatric: She has a normal mood and affect. Judgment normal.         ED Course   Procedures  Labs Reviewed   COMPREHENSIVE METABOLIC PANEL - Abnormal       Result Value    Sodium 141      Potassium 3.8      Chloride 105      CO2 23      Glucose 92      Blood Urea Nitrogen 13.7      Creatinine 1.03 (*)     Calcium 9.6      Protein Total 7.7      Albumin 4.4      Globulin 3.3      Albumin/Globulin Ratio 1.3      Bilirubin Total 0.5      ALP 63      ALT 10      AST 13      eGFR >60      Anion Gap 13.0       BUN/Creatinine Ratio 13     URINALYSIS, REFLEX TO URINE CULTURE - Abnormal    Color, UA Light-Yellow      Appearance, UA Clear      Specific Gravity, UA 1.012      pH, UA 7.5      Protein, UA Negative      Glucose, UA Normal      Ketones, UA Negative      Blood, UA Negative      Bilirubin, UA Negative      Urobilinogen, UA Normal      Nitrites, UA Negative      Leukocyte Esterase, UA Negative      RBC, UA 0-5      WBC, UA 0-5      Bacteria, UA None Seen      Squamous Epithelial Cells, UA Trace      Mucous, UA Trace (*)    CBC W/ AUTO DIFFERENTIAL    Narrative:     The following orders were created for panel order CBC W/ AUTO DIFFERENTIAL.  Procedure                               Abnormality         Status                     ---------                               -----------         ------                     CBC with Differential[[id number]]                           Final result                 Please view results for these tests on the individual orders.   CBC WITH DIFFERENTIAL    WBC 5.61      RBC 4.32      Hgb 13.2      Hct 38.4      MCV 88.9      MCH 30.6      MCHC 34.4      RDW 11.9      Platelet 331      MPV 9.3      Neut % 49.5      Lymph % 36.0      Mono % 5.9      Eos % 7.5      Basophil % 0.9      Imm Grans % 0.2      Neut # 2.78      Lymph # 2.02      Mono # 0.33      Eos # 0.42      Baso # 0.05      Imm Gran # 0.01      NRBC% 0.0            Imaging Results              CT Abdomen Pelvis With IV Contrast NO Oral Contrast (Final result)  Result time 02/26/25 12:55:18      Final result by Jermain Castillo MD (02/26/25 12:55:18)                   Impression:      Right-sided hydronephrosis and hydroureter secondary to a 4.3 x 4 x 5.9 mm stone in the right distal ureter    A couple of punctate nonobstructing medullary calculi in both kidneys    Multiple varices seen at the chevy hepatis and around portal vein 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  Similar changes were seen  previously.      Electronically signed by: Vasquez Castillo  Date:    02/26/2025  Time:    12:55               Narrative:    EXAMINATION:  CT ABDOMEN PELVIS WITH IV CONTRAST    CLINICAL HISTORY:  RLQ abdominal pain (Age >= 14y);    TECHNIQUE:  Low dose axial images, sagittal and coronal reformations were obtained from the lung bases to the pubic symphysis following the IV administration of contrast. Automatic exposure control (AEC) is utilized to reduce patient radiation exposure.    COMPARISON:  01/17/2019    FINDINGS:  The lung bases are clear.    The liver is normal in size.  No liver mass or lesion is seen.  There are multiple varices seen at the chevy hepatis and adjacent to the portal vein likely representing cavernous transformation of the portal vein.  This was seen on the prior examination as well..    The patient appears to be status post cholecystectomy.    The pancreas appears normal.  No pancreatic mass or lesion is seen.    The spleen shows no acute abnormality.    The adrenal glands appear normal.  No adrenal nodule is seen.    There is a punctate nonobstructing medullary calculus in the lower pole of the left kidney.  No hydronephrosis or hydroureter seen on the left.    There is perinephric stranding and perinephric edema seen on the right side.  There is right-sided hydronephrosis and hydroureter secondary to a stone in the right distal ureter that measures 4.3 by 4 by 5.9 mm.  There is a punctate 2 mm nonobstructing medullary calculus in midpole of the right kidney..    Urinary bladder appears grossly unremarkable.    No colitis is seen.  No diverticulitis is seen.  No obvious colonic mass or lesion is seen.    No free air is seen.  No free fluid is seen.                                       Medications   morphine injection 4 mg (has no administration in time range)   ketorolac injection 30 mg (30 mg Intravenous Given 2/26/25 1040)   morphine injection 4 mg (4 mg Intravenous Given 2/26/25 1040)    ondansetron injection 4 mg (4 mg Intravenous Given 2/26/25 1040)   lactated ringers bolus 1,000 mL (0 mLs Intravenous Stopped 2/26/25 1141)   morphine injection 4 mg (4 mg Intravenous Given 2/26/25 1255)   iohexoL (OMNIPAQUE 350) injection 90 mL (90 mLs Intravenous Given 2/26/25 1244)     Medical Decision Making  Differential diagnosis includes but is not limited to obstruction, kidney stone, and appendicitis.     Patient with moderate reproducible abdominal pain so initial concern was for abdominal pathology like appendicitis or obstruction given her multiple surgery CT was done her CBC chemistry was normal her urinalysis was all her CT did show a 4 x 4 x 5.9 mm stone discussed case with Urology nurse practitioner will see in as an outpatient patient pain well-controlled    Problems Addressed:  Calculus of lower urinary tract: complicated acute illness or injury that poses a threat to life or bodily functions    Amount and/or Complexity of Data Reviewed  Labs: ordered.  Radiology: ordered.    Risk  Prescription drug management.            Scribe Attestation:   Scribe #1: I performed the above scribed service and the documentation accurately describes the services I performed. I attest to the accuracy of the note.    Attending Attestation:           Physician Attestation for Scribe:  Physician Attestation Statement for Scribe #1: I, Jonnie Gonzalez III, MD, reviewed documentation, as scribed by Tiburcio Burt in my presence, and it is both accurate and complete.             ED Course as of 02/26/25 1502   Wed Feb 26, 2025   1327 CBC H&H chemistry renal function UA normal patient will be evaluated by consultation with Urology does have a 4 x 4 x 6 cm kidney stone [FK]      ED Course User Index  [FK] Jonnie Gonzalez III, MD                           Clinical Impression:  Final diagnoses:  [N21.9] Calculus of lower urinary tract (Primary)          ED Disposition Condition    Discharge Stable          ED  Prescriptions       Medication Sig Dispense Start Date End Date Auth. Provider    oxyCODONE-acetaminophen (PERCOCET) 5-325 mg per tablet Take 1 tablet by mouth every 4 (four) hours as needed for Pain. 20 tablet 2/26/2025 -- Jonnie Gonzalez III, MD    tamsulosin (FLOMAX) 0.4 mg Cap Take 1 capsule (0.4 mg total) by mouth once daily. for 7 days 7 capsule 2/26/2025 3/5/2025 Jonnie Gonzalez III, MD    ondansetron (ZOFRAN) 4 MG tablet Take 1 tablet (4 mg total) by mouth every 6 (six) hours. 12 tablet 2/26/2025 -- Jonnie Gonzalez III, MD          Follow-up Information       Follow up With Specialties Details Why Contact Info    Ba Cervantes MD Family Medicine In 3 days  [address]  [facility] [address]  [phone]      Niko Mills MD Urology In 1 week  [address]  [address]  [facility] [address]  [phone]               Jonnie Gonzalez III, MD  02/26/25 1503         [1]   Social History  Tobacco Use    Smoking status: Never    Smokeless tobacco: Never   Substance Use Topics    Alcohol use: Yes     Comment: occasionally    Drug use: Never        Jonnie Gonzalez III, MD  02/26/25 0109

## 2025-02-26 NOTE — FIRST PROVIDER EVALUATION
"Medical screening examination initiated.  I have conducted a focused provider triage encounter, findings are as follows:    Brief history of present illness:  arrived to ED due to RLQ abdominal pain that began on yesterday. Denies n/v/d. LBM: today.     Vitals:    02/26/25 0952   BP: (!) 143/93   Pulse: 99   Resp: 20   Temp: 97.2 °F (36.2 °C)   TempSrc: Temporal   SpO2: 98%   Weight: 83.9 kg (185 lb)   Height: 5' 5" (1.651 m)       Pertinent physical exam:  awake, alert, has non-labored breathing, in wheelchair    Brief workup plan:  labs    Preliminary workup initiated; this workup will be continued and followed by the physician or advanced practice provider that is assigned to the patient when roomed.  "

## 2025-03-21 ENCOUNTER — PROCEDURE VISIT (OUTPATIENT)
Dept: HEPATOLOGY | Facility: CLINIC | Age: 40
End: 2025-03-21
Payer: COMMERCIAL

## 2025-03-21 ENCOUNTER — LAB VISIT (OUTPATIENT)
Dept: LAB | Facility: HOSPITAL | Age: 40
End: 2025-03-21
Attending: INTERNAL MEDICINE
Payer: COMMERCIAL

## 2025-03-21 ENCOUNTER — OFFICE VISIT (OUTPATIENT)
Dept: HEPATOLOGY | Facility: CLINIC | Age: 40
End: 2025-03-21
Payer: COMMERCIAL

## 2025-03-21 VITALS
HEIGHT: 65 IN | WEIGHT: 178.38 LBS | BODY MASS INDEX: 29.72 KG/M2 | OXYGEN SATURATION: 100 % | HEART RATE: 97 BPM | SYSTOLIC BLOOD PRESSURE: 144 MMHG | DIASTOLIC BLOOD PRESSURE: 79 MMHG | RESPIRATION RATE: 18 BRPM

## 2025-03-21 DIAGNOSIS — I81 PORTAL VEIN THROMBOSIS: ICD-10-CM

## 2025-03-21 DIAGNOSIS — Z86.711 HISTORY OF PULMONARY EMBOLISM: ICD-10-CM

## 2025-03-21 DIAGNOSIS — R93.2 ABNORMAL FINDING ON IMAGING OF LIVER: ICD-10-CM

## 2025-03-21 DIAGNOSIS — I81 CAVERNOUS TRANSFORMATION OF PORTAL VEIN: ICD-10-CM

## 2025-03-21 LAB
ALBUMIN SERPL BCP-MCNC: 4.3 G/DL (ref 3.5–5.2)
ALP SERPL-CCNC: 64 U/L (ref 40–150)
ALT SERPL W/O P-5'-P-CCNC: 11 U/L (ref 10–44)
AST SERPL-CCNC: 16 U/L (ref 10–40)
BILIRUB DIRECT SERPL-MCNC: 0.2 MG/DL (ref 0.1–0.3)
BILIRUB SERPL-MCNC: 0.5 MG/DL (ref 0.1–1)
HAV IGG SER QL IA: NORMAL
HBV CORE AB SERPL QL IA: NORMAL
HBV SURFACE AB SER-ACNC: 133.87 MIU/ML
HBV SURFACE AB SER-ACNC: REACTIVE M[IU]/ML
HBV SURFACE AG SERPL QL IA: NORMAL
HCV AB SERPL QL IA: NORMAL
HIV 1+2 AB+HIV1 P24 AG SERPL QL IA: NORMAL
INR PPP: 1 (ref 0.8–1.2)
PROT SERPL-MCNC: 7.9 G/DL (ref 6–8.4)
PROTHROMBIN TIME: 11.1 SEC (ref 9–12.5)

## 2025-03-21 PROCEDURE — 87340 HEPATITIS B SURFACE AG IA: CPT | Performed by: INTERNAL MEDICINE

## 2025-03-21 PROCEDURE — 86704 HEP B CORE ANTIBODY TOTAL: CPT | Performed by: INTERNAL MEDICINE

## 2025-03-21 PROCEDURE — 86706 HEP B SURFACE ANTIBODY: CPT | Mod: 91 | Performed by: INTERNAL MEDICINE

## 2025-03-21 PROCEDURE — 86790 VIRUS ANTIBODY NOS: CPT | Performed by: INTERNAL MEDICINE

## 2025-03-21 PROCEDURE — 85610 PROTHROMBIN TIME: CPT | Performed by: INTERNAL MEDICINE

## 2025-03-21 PROCEDURE — 99999 PR PBB SHADOW E&M-EST. PATIENT-LVL IV: CPT | Mod: PBBFAC,,, | Performed by: INTERNAL MEDICINE

## 2025-03-21 PROCEDURE — 36415 COLL VENOUS BLD VENIPUNCTURE: CPT | Performed by: INTERNAL MEDICINE

## 2025-03-21 PROCEDURE — 91200 LIVER ELASTOGRAPHY: CPT | Mod: S$GLB,,, | Performed by: INTERNAL MEDICINE

## 2025-03-21 PROCEDURE — 86803 HEPATITIS C AB TEST: CPT | Performed by: INTERNAL MEDICINE

## 2025-03-21 PROCEDURE — 80076 HEPATIC FUNCTION PANEL: CPT | Performed by: INTERNAL MEDICINE

## 2025-03-21 PROCEDURE — 87389 HIV-1 AG W/HIV-1&-2 AB AG IA: CPT | Performed by: INTERNAL MEDICINE

## 2025-03-21 NOTE — PROGRESS NOTES
"Hepatology Consult Note    Referring provider: Dr. MARK Prieto  PCP: Ba Cervantes MD    Chief complaint: abnormal imaging of the liver     HPI:  Velma Gordon is a 39 y.o. female with endometriosis s/p hysterectomy () who was referred to Hepatology Clinic for abnormal imaging of the liver.     The patient denies prior history of EtOH abuse, illicit drug use, blood transfusions. Prior unregulated tattoos, last ~. MASLD risk factors: overweight. Denies history of DM, HTN, HLD, AGATA, PCOS. Denies personal history of liver disease. Denies family history of liver disease or liver cancer.     Pulmonary embolism around the time of cholecystectomy and used to be on a blood thinner. Now off anticoagulation.     The patient denies jaundice. The patient denies prior evaluation by hepatologist, liver biopsy, paracentesis.    Denies prior episodes of pancreatitis. Denies history of intraabdominal infectious. Does not think she had umbilical sepsis as a .    Upper abdominal surgeries include cholecystectomy . Denies prior bariatric surgery.     Past Medical History:   Diagnosis Date    Endometriosis     Other pulmonary embolism without acute cor pulmonale     Pelvic adhesions     PID (pelvic inflammatory disease)        Past Surgical History:   Procedure Laterality Date     SECTION      CHOLECYSTECTOMY  2018    dx scope  2013    HYSTERECTOMY  2014    DARLENE with ovarian preservation    HYSTEROSCOPY      VAGINAL CUFF         Family History   Problem Relation Name Age of Onset    Diabetes Mother Jody     Hypertension Mother Jody     Hypertension Father Ryan     Stroke Paternal Grandmother Valerie        Social History[1]    Current Medications[2]    Review of patient's allergies indicates:  No Known Allergies         Vitals:    25 1429   BP: (!) 144/79   Pulse: 97   Resp: 18   SpO2: 100%   Weight: 80.9 kg (178 lb 5.6 oz)   Height: 5' 5" (1.651 m)     Body mass index " "is 29.68 kg/m².    Physical Exam  Constitutional:       General: She is not in acute distress.  Eyes:      General: No scleral icterus.  Cardiovascular:      Rate and Rhythm: Normal rate.   Pulmonary:      Effort: Pulmonary effort is normal.   Abdominal:      General: Abdomen is flat. There is no distension.      Palpations: Abdomen is soft. There is no fluid wave, hepatomegaly or splenomegaly.      Tenderness: There is abdominal tenderness in the right upper quadrant.   Musculoskeletal:      Right lower leg: No edema.      Left lower leg: No edema.   Skin:     General: Skin is warm.      Coloration: Skin is not jaundiced.      Comments: No spider angiomas. No palmar erythema. No leukonychia.    Neurological:      General: No focal deficit present.      Mental Status: She is alert and oriented to person, place, and time.   Psychiatric:         Behavior: Behavior is cooperative.         Thought Content: Thought content normal.         LABS: I personally reviewed pertinent laboratory findings.    Lab Results   Component Value Date    WBC 5.61 02/26/2025    HGB 13.2 02/26/2025    HCT 38.4 02/26/2025    MCV 88.9 02/26/2025     02/26/2025       Lab Results   Component Value Date     02/26/2025    K 3.8 02/26/2025     02/26/2025    CO2 23 02/26/2025    BUN 13.7 02/26/2025    CREATININE 1.03 (H) 02/26/2025    CALCIUM 9.6 02/26/2025    ANIONGAP 6 (L) 09/03/2014    ESTGFRAFRICA >60.0 09/03/2014    EGFRNONAA >60 01/17/2019       Lab Results   Component Value Date    ALT 10 02/26/2025    AST 13 02/26/2025    ALKPHOS 63 02/26/2025    BILITOT 0.5 02/26/2025       No results found for: "HAV", "HEPAIGM", "HEPBIGM", "HEPBCAB", "HBEAG", "HEPCAB"    No results found for: "HEATHER", "MITOAB", "SMOOTHMUSCAB", "IGG", "CERULOPLSM"     MELD 3.0: 7 at 8/28/2024  1:23 PM  MELD-Na: 6 at 8/28/2024  1:23 PM  Calculated from:  Serum Creatinine: 0.88 mg/dL (Using min of 1 mg/dL) at 8/28/2024  1:23 PM  Serum Sodium: 139 mmol/L " (Using max of 137 mmol/L) at 8/28/2024  1:23 PM  Total Bilirubin: 0.4 mg/dL (Using min of 1 mg/dL) at 8/28/2024  1:23 PM  Serum Albumin: 4.1 g/dL (Using max of 3.5 g/dL) at 8/28/2024  1:23 PM  INR(ratio): 1 at 8/28/2024  1:23 PM  Age at listing (hypothetical): 39 years  Sex: Female at 8/28/2024  1:23 PM       IMAGING: I personally reviewed imaging studies.    US abdomen 9/9/24 (media tab)  11/27/24 media   EGD       Assessment:  39 y.o. female   Per chart review, liver enzymes and bilirubin have been unremarkable dating back to 2018 in our system. Platelet count and INR are normal. On CT A/P with contrast (2/26/25), there are multiple varices seen at the chevy hepatis and around portal vein 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 On CT A/P w contrast (9/7/18), the liver is normal in size and contour without focal lesion, there is no biliary ductal dilatation, cavernous transformation of the portal vein is noted. On CT A/P with contrast (8/31/14), the portal vein is patent and normal in caliber, and right upper quadrant periportal varices are again identified, the spleen is unremarkable. Outside EGD 11/27/24 without stigmata of portal hypertension.     Etiology of chronic PVT with cavernous transformation is unclear at this time. Possibly from underlying hypercoagulable disorder given prior history of pulmonary embolism. Fortunately, there is no evidence of portal hypertension at this time (normal platelets, no ascites, no varices).     Regarding RUQ abd pain, low suspicion for the liver or PVT to the the cause given there is no hepatomegaly or hepatitis.    Plan to repeat liver enzymes, viral hepatitis panel, non-invasive measure of fibrosis. Will review imaging in IR conference. Will refer to Heme for hypercoagulable workup.    1. Abnormal finding on imaging of liver    2. Cavernous transformation of portal vein    3. Portal vein thrombosis    4. History of pulmonary embolism       Recommendations:  - LFTs, INR   - HAV IgG, HBsAg, HBsAb, HBc Total Ab, HCV ab, HIV  - FibroScan   - Review imaging in IR conference   - Hematology referral to evaluate for hypercoag state   - Consider liver biopsy    Return to clinic in 3 months.    I have sent communication to the referring physician and/or primary care provider.    Octavia Nicole MD  Staff Physician  Hepatology and Liver Transplant  [facility]  [facility] [place]           [1]   Social History  Tobacco Use    Smoking status: Never    Smokeless tobacco: Never   Substance Use Topics    Alcohol use: Yes     Comment: occasionally    Drug use: Never   [2]   Current Outpatient Medications   Medication Sig Dispense Refill    ondansetron (ZOFRAN) 4 MG tablet Take 1 tablet (4 mg total) by mouth every 6 (six) hours. 12 tablet 0    oxyCODONE-acetaminophen (PERCOCET) 5-325 mg per tablet Take 1 tablet by mouth every 4 (four) hours as needed for Pain. 20 tablet 0    azelastine (ASTELIN) 137 mcg (0.1 %) nasal spray 1 spray (137 mcg total) by Nasal route 2 (two) times daily. (Patient not taking: Reported on 3/21/2025) 90 mL 3    tamsulosin (FLOMAX) 0.4 mg Cap Take 1 capsule (0.4 mg total) by mouth once daily. for 7 days 7 capsule 0    ZOLMitriptan (ZOMIG) 5 MG tablet TAKE 1 TABLET BY MOUTH ONCE DAILY AS NEEDED FOR MIGRAINES (Patient not taking: Reported on 3/21/2025)       No current facility-administered medications for this visit.

## 2025-03-24 ENCOUNTER — RESULTS FOLLOW-UP (OUTPATIENT)
Dept: HEPATOLOGY | Facility: CLINIC | Age: 40
End: 2025-03-24
Payer: COMMERCIAL

## 2025-03-24 NOTE — PROCEDURES
FibroScan Transplant Hepatology    Date/Time: 3/21/2025 3:15 PM    Performed by: Timo Vallejo MA  Authorized by: Octavia Nicole MD    Diagnosis:  Other    Probe:  XL    Universal Protocol: Patient's identity, procedure and site were verified, confirmatory pause was performed.  Discussed procedure including risks and potential complications.  Questions answered.  Patient verbalizes understanding and wishes to proceed with VCTE.     Procedure: After providing explanations of the procedure, patient was placed in the supine position with right arm in maximum abduction to allow optimal exposure of right lateral abdomen.  Patient was briefly assessed, Testing was performed in the mid-axillary location, 50Hz Shear Wave pulses were applied and the resulting Shear Wave and Propagation Speed detected with a 3.5 MHz ultrasonic signal, using the FibroScan probe, Skin to liver capsule distance and liver parenchyma were accessed during the entire examination with the FibroScan probe, Patient was instructed to breathe normally and to abstain from sudden movements during the procedure, allowing for random measurements of liver stiffness. At least 10 Shear Waves were produced, Individual measurements of each Shear Wave were calculated.  Patient tolerated the procedure well with no complications.  Meets discharge criteria as was dismissed.  Rates pain 0 out of 10.  Patient will follow up with ordering provider to review results.    Findings  Median liver stiffness score:  7.2  CAP Reading: dB/m:  239    IQR/med %:  12  Interpretation  Fibrosis interpretation is based on medial liver stiffness - Kilopascal (kPa).    Fibrosis Stage:  F2  Steatosis interpretation is based on controlled attenuation parameter - (dB/m).    Steatosis Grade:  <S1

## 2025-03-24 NOTE — TELEPHONE ENCOUNTER
Call returned to the Patient at [phone] to inquire how she was doing with her pain.  No Answer. Left VM message to call us back at the Office of Dr Nicole, Liver Clinic at [phone] to let us know how you are doing. This is Justina.

## 2025-04-14 ENCOUNTER — OFFICE VISIT (OUTPATIENT)
Dept: HEMATOLOGY/ONCOLOGY | Facility: CLINIC | Age: 40
End: 2025-04-14
Payer: COMMERCIAL

## 2025-04-14 VITALS
BODY MASS INDEX: 29.47 KG/M2 | WEIGHT: 176.88 LBS | TEMPERATURE: 99 F | RESPIRATION RATE: 14 BRPM | HEIGHT: 65 IN | HEART RATE: 88 BPM | OXYGEN SATURATION: 99 % | DIASTOLIC BLOOD PRESSURE: 81 MMHG | SYSTOLIC BLOOD PRESSURE: 132 MMHG

## 2025-04-14 DIAGNOSIS — Z86.711 HISTORY OF PULMONARY EMBOLISM: ICD-10-CM

## 2025-04-14 DIAGNOSIS — I81 CAVERNOUS TRANSFORMATION OF PORTAL VEIN: ICD-10-CM

## 2025-04-14 DIAGNOSIS — I81 PORTAL VEIN THROMBOSIS: ICD-10-CM

## 2025-04-14 LAB
BASOPHILS # BLD AUTO: 0.05 X10(3)/MCL
BASOPHILS NFR BLD AUTO: 0.9 %
EOSINOPHIL # BLD AUTO: 0.2 X10(3)/MCL (ref 0–0.9)
EOSINOPHIL NFR BLD AUTO: 3.4 %
ERYTHROCYTE [DISTWIDTH] IN BLOOD BY AUTOMATED COUNT: 12.1 % (ref 11.5–17)
HCT VFR BLD AUTO: 35.9 % (ref 37–47)
HGB BLD-MCNC: 12.1 G/DL (ref 12–16)
IMM GRANULOCYTES # BLD AUTO: 0.01 X10(3)/MCL (ref 0–0.04)
IMM GRANULOCYTES NFR BLD AUTO: 0.2 %
LYMPHOCYTES # BLD AUTO: 2.32 X10(3)/MCL (ref 0.6–4.6)
LYMPHOCYTES NFR BLD AUTO: 39.7 %
MCH RBC QN AUTO: 31.3 PG (ref 27–31)
MCHC RBC AUTO-ENTMCNC: 33.7 G/DL (ref 33–36)
MCV RBC AUTO: 93 FL (ref 80–94)
MONOCYTES # BLD AUTO: 0.37 X10(3)/MCL (ref 0.1–1.3)
MONOCYTES NFR BLD AUTO: 6.3 %
NEUTROPHILS # BLD AUTO: 2.89 X10(3)/MCL (ref 2.1–9.2)
NEUTROPHILS NFR BLD AUTO: 49.5 %
PLATELET # BLD AUTO: 333 X10(3)/MCL (ref 130–400)
PMV BLD AUTO: 9 FL (ref 7.4–10.4)
RBC # BLD AUTO: 3.86 X10(6)/MCL (ref 4.2–5.4)
TT IMM BOVINE THROMBIN PPP: 18 SECONDS (ref 0–22)
WBC # BLD AUTO: 5.84 X10(3)/MCL (ref 4.5–11.5)

## 2025-04-14 PROCEDURE — 85300 ANTITHROMBIN III ACTIVITY: CPT | Performed by: INTERNAL MEDICINE

## 2025-04-14 PROCEDURE — 81241 F5 GENE: CPT | Performed by: INTERNAL MEDICINE

## 2025-04-14 PROCEDURE — 36415 COLL VENOUS BLD VENIPUNCTURE: CPT | Performed by: INTERNAL MEDICINE

## 2025-04-14 PROCEDURE — 3075F SYST BP GE 130 - 139MM HG: CPT | Mod: CPTII,S$GLB,, | Performed by: INTERNAL MEDICINE

## 2025-04-14 PROCEDURE — 3079F DIAST BP 80-89 MM HG: CPT | Mod: CPTII,S$GLB,, | Performed by: INTERNAL MEDICINE

## 2025-04-14 PROCEDURE — 86147 CARDIOLIPIN ANTIBODY EA IG: CPT | Performed by: INTERNAL MEDICINE

## 2025-04-14 PROCEDURE — 85303 CLOT INHIBIT PROT C ACTIVITY: CPT | Performed by: INTERNAL MEDICINE

## 2025-04-14 PROCEDURE — 3008F BODY MASS INDEX DOCD: CPT | Mod: CPTII,S$GLB,, | Performed by: INTERNAL MEDICINE

## 2025-04-14 PROCEDURE — 85306 CLOT INHIBIT PROT S FREE: CPT | Performed by: INTERNAL MEDICINE

## 2025-04-14 PROCEDURE — 85613 RUSSELL VIPER VENOM DILUTED: CPT | Performed by: INTERNAL MEDICINE

## 2025-04-14 PROCEDURE — 99999 PR PBB SHADOW E&M-EST. PATIENT-LVL IV: CPT | Mod: PBBFAC,,, | Performed by: INTERNAL MEDICINE

## 2025-04-14 PROCEDURE — 81240 F2 GENE: CPT | Performed by: INTERNAL MEDICINE

## 2025-04-14 PROCEDURE — 1159F MED LIST DOCD IN RCRD: CPT | Mod: CPTII,S$GLB,, | Performed by: INTERNAL MEDICINE

## 2025-04-14 PROCEDURE — 85670 THROMBIN TIME PLASMA: CPT | Performed by: INTERNAL MEDICINE

## 2025-04-14 PROCEDURE — 99204 OFFICE O/P NEW MOD 45 MIN: CPT | Mod: S$GLB,,, | Performed by: INTERNAL MEDICINE

## 2025-04-14 PROCEDURE — 85306 CLOT INHIBIT PROT S FREE: CPT | Mod: 59 | Performed by: INTERNAL MEDICINE

## 2025-04-14 PROCEDURE — 85025 COMPLETE CBC W/AUTO DIFF WBC: CPT | Performed by: INTERNAL MEDICINE

## 2025-04-14 PROCEDURE — 86146 BETA-2 GLYCOPROTEIN ANTIBODY: CPT | Mod: 59 | Performed by: INTERNAL MEDICINE

## 2025-04-14 RX ORDER — INDOMETHACIN 25 MG/1
25 CAPSULE ORAL 3 TIMES DAILY
COMMUNITY
Start: 2025-04-03

## 2025-04-14 NOTE — PROGRESS NOTES
Referring physician: Dr. Octavia Nicole  Reason for referral: Portal Vein Thrombosis      Subjective:       Patient ID: Velma Gordon is a 39 y.o. female.    1. Portal Vein Thrombosis (Chronic)--Since 2014    2. H/o Pulmonary Embolism--2016     Imaging:  CTA chest done 8/28/24--negative for PE, no acute findings  US abdomen complete done at [facility] 9/9/24--cavernous transformation of portal vein, chronic and similar to previous CT, prior cholecystectomy, no biliary dilatation, no acute abnormality identified.   CT A/P w/ contrast done 2/26/25--right sided hypdronephrosis and hydroureter secondary to a 4.3X4X5.9mm stone in distal ureter, a couple of punctate nonobstructing medullary calculi in both kidneys, multiple varices seen at [facility] and around portal vein 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     Chief Complaint: Other Misc (NPH)    HPI  40 yo female with h/o endometriosis, s/p hysterectomy(ovaries remain) in 2015, also underwent cholecystectomy in 2016, developed PE after surgery, was on anticoagulation x 3 months, found to have cavernous transformation of the portal vein on imaging with multiple varices at [facility]. This has been present on imaging since 2018. Patient seen by hepatology and etiology of the PVT is unknown. Patient has been referred to me for hypercoagulable work-up. She has no evidence of portal HTN at this time, has normal platelets, no ascites and no varices. Of note, patient was on and off OCPs prior to her hysterectomy in 2015 for endometriosis. She has no FH of any blood clot and no other blood clot in her lifetime. She does not smoke and she is not currently on any hormones. Reports that her abdominal pain resolved after she passed the kidney stone. She had 2 pregnancies, 2 deliveries with no complications, and no h/o any miscarriages.     Past Medical History:   Diagnosis Date    Endometriosis     Other pulmonary embolism without acute  cor pulmonale     Pelvic adhesions     PID (pelvic inflammatory disease)       Past Surgical History:   Procedure Laterality Date     SECTION      CHOLECYSTECTOMY  2018    dx scope  2013    HYSTERECTOMY  2014    DARLENE with ovarian preservation    HYSTEROSCOPY      VAGINAL CUFF       Family History   Problem Relation Name Age of Onset    Diabetes Mother Jody     Hypertension Mother Jody     Hypertension Father Ryan     Stroke Paternal Grandmother Valerie      Social History     Socioeconomic History    Marital status: Single   Tobacco Use    Smoking status: Never    Smokeless tobacco: Never   Substance and Sexual Activity    Alcohol use: Yes     Comment: occasionally    Drug use: Never    Sexual activity: Yes     Partners: Male     Birth control/protection: None     Social Drivers of Health     Financial Resource Strain: Low Risk  (3/15/2025)    Overall Financial Resource Strain (CARDIA)     Difficulty of Paying Living Expenses: Not hard at all   Food Insecurity: No Food Insecurity (3/15/2025)    Hunger Vital Sign     Worried About Running Out of Food in the Last Year: Never true     Ran Out of Food in the Last Year: Never true   Transportation Needs: No Transportation Needs (3/15/2025)    PRAPARE - Transportation     Lack of Transportation (Medical): No     Lack of Transportation (Non-Medical): No   Physical Activity: Inactive (3/15/2025)    Exercise Vital Sign     Days of Exercise per Week: 0 days     Minutes of Exercise per Session: 10 min   Stress: Stress Concern Present (3/15/2025)    [employer] [place] [employer] - Occupational Stress Questionnaire     Feeling of Stress : To some extent   Housing Stability: Low Risk  (3/15/2025)    Housing Stability Vital Sign     Unable to Pay for Housing in the Last Year: No     Number of Times Moved in the Last Year: 0     Homeless in the Last Year: No       Review of patient's allergies indicates:  No Known Allergies   Medications Ordered Prior  "to Encounter[1]   Review of Systems   Constitutional:  Negative for unexpected weight change.   HENT:  Negative for mouth sores.    Eyes:  Negative for visual disturbance.   Respiratory:  Negative for cough and shortness of breath.    Cardiovascular:  Negative for chest pain.   Gastrointestinal:  Negative for abdominal pain.   Genitourinary:  Negative for frequency.   Musculoskeletal:  Negative for back pain.   Integumentary:  Negative for rash.   Neurological:  Negative for headaches.   Hematological:  Negative for adenopathy.   Psychiatric/Behavioral:  The patient is nervous/anxious.               Vitals:    04/14/25 1352   BP: 132/81   Pulse: 88   Resp: 14   Temp: 98.5 °F (36.9 °C)   TempSrc: Oral   SpO2: 99%   Weight: 80.2 kg (176 lb 14.4 oz)   Height: 5' 5" (1.651 m)      Physical Exam  Constitutional:       Appearance: Normal appearance.   HENT:      Head: Normocephalic and atraumatic.      Nose: Nose normal.      Mouth/Throat:      Mouth: Mucous membranes are moist.   Eyes:      Extraocular Movements: Extraocular movements intact.   Cardiovascular:      Rate and Rhythm: Normal rate and regular rhythm.   Pulmonary:      Effort: Pulmonary effort is normal.      Breath sounds: Normal breath sounds.   Abdominal:      General: Bowel sounds are normal.      Palpations: Abdomen is soft.   Musculoskeletal:      Cervical back: Normal range of motion and neck supple.      Right lower leg: No edema.      Left lower leg: No edema.   Neurological:      General: No focal deficit present.      Mental Status: She is alert and oriented to person, place, and time.   Psychiatric:         Mood and Affect: Mood normal.         Behavior: Behavior normal.        Office Visit on 04/14/2025   Component Date Value Ref Range Status    WBC 04/14/2025 5.84  4.50 - 11.50 x10(3)/mcL Final    RBC 04/14/2025 3.86 (L)  4.20 - 5.40 x10(6)/mcL Final    Hgb 04/14/2025 12.1  12.0 - 16.0 g/dL Final    Hct 04/14/2025 35.9 (L)  37.0 - 47.0 % Final "    MCV 04/14/2025 93.0  80.0 - 94.0 fL Final    MCH 04/14/2025 31.3 (H)  27.0 - 31.0 pg Final    MCHC 04/14/2025 33.7  33.0 - 36.0 g/dL Final    RDW 04/14/2025 12.1  11.5 - 17.0 % Final    Platelet 04/14/2025 333  130 - 400 x10(3)/mcL Final    MPV 04/14/2025 9.0  7.4 - 10.4 fL Final    Neut % 04/14/2025 49.5  % Final    Lymph % 04/14/2025 39.7  % Final    Mono % 04/14/2025 6.3  % Final    Eos % 04/14/2025 3.4  % Final    Basophil % 04/14/2025 0.9  % Final    Imm Grans % 04/14/2025 0.2  % Final    Neut # 04/14/2025 2.89  2.1 - 9.2 x10(3)/mcL Final    Lymph # 04/14/2025 2.32  0.6 - 4.6 x10(3)/mcL Final    Mono # 04/14/2025 0.37  0.1 - 1.3 x10(3)/mcL Final    Eos # 04/14/2025 0.20  0 - 0.9 x10(3)/mcL Final    Baso # 04/14/2025 0.05  <=0.2 x10(3)/mcL Final    Imm Gran # 04/14/2025 0.01  0.00 - 0.04 x10(3)/mcL Final           Assessment:       1. Cavernous transformation of portal vein    2. Portal vein thrombosis    3. History of pulmonary embolism         Plan:       Patient with chronic PVT, seen on scans since at least 2014.  Also with h/o PE following cholecystectomy in 2016, treated with 3 months of presumed Xarelto.     Unclear etiology of PVT, referred by hepatology for hypercoagulable work-up which will be done today.  She has no findings on CBC concerning for any MPN.    I wonder if the thrombosis could have possibly been related to birth control and hormones that she was on for a prolonged period of time for her ongoing endometriosis.     F/u in 2 weeks with results.     I don't feel strongly at this time that she needs lifelong anticoagulation since her PE was provoked by surgery and since the PVT is chronic.  However, she will need prolonged prophylaxis for any future surgeries.    All questions answered at this time.    Radha Dangelo MD                           [1]   Current Outpatient Medications on File Prior to Visit   Medication Sig Dispense Refill    azelastine (ASTELIN) 137 mcg (0.1 %) nasal spray 1  spray (137 mcg total) by Nasal route 2 (two) times daily. (Patient taking differently: 1 spray by Nasal route as needed.) 90 mL 3    indomethacin (INDOCIN) 25 MG capsule Take 25 mg by mouth 3 (three) times daily.      ondansetron (ZOFRAN) 4 MG tablet Take 1 tablet (4 mg total) by mouth every 6 (six) hours. 12 tablet 0    oxyCODONE-acetaminophen (PERCOCET) 5-325 mg per tablet Take 1 tablet by mouth every 4 (four) hours as needed for Pain. 20 tablet 0    ZOLMitriptan (ZOMIG) 5 MG tablet       tamsulosin (FLOMAX) 0.4 mg Cap Take 1 capsule (0.4 mg total) by mouth once daily. for 7 days (Patient not taking: Reported on 4/14/2025) 7 capsule 0     No current facility-administered medications on file prior to visit.

## 2025-04-15 LAB
AT III ACT/NOR PPP CHRO: 94 % (ref 80–130)
B2 GLYCOPROT1 IGG SERPL IA-ACNC: <9.4 SGU
B2 GLYCOPROT1 IGM SERPL IA-ACNC: <9.4 SMU
DRVV CONF RATIO (OHS): 0.91
DRVV NORM RATIO (OHS): 1.04 (ref 0–1.19)
DRVV SCR RATIO (OHS): 0.95
L.A. PATH INTERP (OHS): NORMAL
LA ST CALC (OHS): 3.8 SECONDS (ref 0–7.9)
PROT C ACT/NOR PPP CHRO: 150 % (ref 70–150)
PROT S ACT/NOR PPP: 115 % (ref 50–150)
PROT S FREE AG ACT/NOR PPP IA: 129 % (ref 50–160)

## 2025-04-16 LAB
CARDIOLIPIN IGA QUANT (OHS): 0.6 APL U/ML
CARDIOLIPIN IGG QUANT (OHS): 2.6 GPL U/ML
CARDIOLIPIN IGM QUANT (OHS): 2.2 MPL U/ML

## 2025-04-17 LAB
COAGULATION SPECIALIST REVIEW: NORMAL
COAGULATION SPECIALIST REVIEW: NORMAL
F2 C.20210G>A GENO BLD/T: NEGATIVE
F5 P.R506Q BLD/T QL: NEGATIVE
PROVIDER SIGNING NAME: NORMAL
PROVIDER SIGNING NAME: NORMAL

## 2025-04-25 ENCOUNTER — TELEPHONE (OUTPATIENT)
Dept: HEMATOLOGY/ONCOLOGY | Facility: CLINIC | Age: 40
End: 2025-04-25
Payer: COMMERCIAL

## 2025-04-25 NOTE — TELEPHONE ENCOUNTER
When I called to pt to confirm appt on Monday, 4/28, Pt states that she is unable to leave work to make this appt and would like to r/s.

## 2025-04-29 ENCOUNTER — OFFICE VISIT (OUTPATIENT)
Dept: HEMATOLOGY/ONCOLOGY | Facility: CLINIC | Age: 40
End: 2025-04-29
Payer: COMMERCIAL

## 2025-04-29 VITALS
WEIGHT: 177.69 LBS | OXYGEN SATURATION: 100 % | HEIGHT: 65 IN | RESPIRATION RATE: 14 BRPM | BODY MASS INDEX: 29.61 KG/M2 | SYSTOLIC BLOOD PRESSURE: 120 MMHG | HEART RATE: 92 BPM | TEMPERATURE: 98 F | DIASTOLIC BLOOD PRESSURE: 74 MMHG

## 2025-04-29 DIAGNOSIS — I81 PORTAL VEIN THROMBOSIS: ICD-10-CM

## 2025-04-29 DIAGNOSIS — Z86.711 HISTORY OF PULMONARY EMBOLISM: Primary | ICD-10-CM

## 2025-04-29 PROCEDURE — 99999 PR PBB SHADOW E&M-EST. PATIENT-LVL IV: CPT | Mod: PBBFAC,,, | Performed by: INTERNAL MEDICINE

## 2025-04-29 NOTE — PROGRESS NOTES
Referring physician: Dr. Octavia Nicole  Reason for referral: Portal Vein Thrombosis      Subjective:       Patient ID: Velma Gordon is a 39 y.o. female.    1. Portal Vein Thrombosis (Chronic)--Since 2014    2. H/o Pulmonary Embolism--2016       Hypercoagulable work-up:  4/14/25--Beta-2 glycoprotein IgG/IgM <9.4, Factor V Leiden negative, LA not detected, Protein C activity 150% normal, Protein S activity 115% normal, Protein S Ag 129% normal, Prothrombin gene negative, ATIII activity 94% kristen, Cardiolipin IgG/IgM/IgA all normal, Beta-2 glycoprotein IgG/IgM <9.4.    Imaging:  CTA chest done 8/28/24--negative for PE, no acute findings  US abdomen complete done at [facility] 9/9/24--cavernous transformation of portal vein, chronic and similar to previous CT, prior cholecystectomy, no biliary dilatation, no acute abnormality identified.   CT A/P w/ contrast done 2/26/25--right sided hypdronephrosis and hydroureter secondary to a 4.3X4X5.9mm stone in distal ureter, a couple of punctate nonobstructing medullary calculi in both kidneys, multiple varices seen at [facility] and around portal vein 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 This has been present on imaging since 2018. Patient seen by hepatology and etiology of the PVT is unknown. Patient has been referred to me for hypercoagulable work-up. She has no evidence of portal HTN at this time, has normal platelets, no ascites and no varices. Of note, patient was on and off OCPs prior to her hysterectomy in 2015 for endometriosis. She has no FH of any blood clot and no  other blood clot in her lifetime. She does not smoke and she is not currently on any hormones. Reports that her abdominal pain resolved after she passed the kidney stone. She had 2 pregnancies, 2 deliveries with no complications, and no h/o any miscarriages.     Patient presents for follow-up of hypercoagulable work-up which was all negative. She continues with on and off abdominal discomfort. I have instructed her to get back with GI if this continues.     Past Medical History:   Diagnosis Date    Endometriosis     Other pulmonary embolism without acute cor pulmonale     Pelvic adhesions     PID (pelvic inflammatory disease)       Past Surgical History:   Procedure Laterality Date     SECTION      CHOLECYSTECTOMY      dx scope  2013    HYSTERECTOMY  2014    DARLENE with ovarian preservation    HYSTEROSCOPY      VAGINAL CUFF       Family History   Problem Relation Name Age of Onset    Diabetes Mother Jody     Hypertension Mother Jody     Hypertension Father Ryan     Stroke Paternal Grandmother Valerie      Social History     Socioeconomic History    Marital status: Single   Tobacco Use    Smoking status: Never    Smokeless tobacco: Never   Substance and Sexual Activity    Alcohol use: Yes     Comment: occasionally    Drug use: Never    Sexual activity: Yes     Partners: Male     Birth control/protection: None     Social Drivers of Health     Financial Resource Strain: Low Risk  (3/15/2025)    Overall Financial Resource Strain (CARDIA)     Difficulty of Paying Living Expenses: Not hard at all   Food Insecurity: No Food Insecurity (3/15/2025)    Hunger Vital Sign     Worried About Running Out of Food in the Last Year: Never true     Ran Out of Food in the Last Year: Never true   Transportation Needs: No Transportation Needs (3/15/2025)    PRAPARE - Transportation     Lack of Transportation (Medical): No     Lack of Transportation (Non-Medical): No   Physical Activity: Inactive (3/15/2025)     "Exercise Vital Sign     Days of Exercise per Week: 0 days     Minutes of Exercise per Session: 10 min   Stress: Stress Concern Present (3/15/2025)    [employer] - Occupational Stress Questionnaire     Feeling of Stress : To some extent   Housing Stability: Low Risk  (3/15/2025)    Housing Stability Vital Sign     Unable to Pay for Housing in the Last Year: No     Number of Times Moved in the Last Year: 0     Homeless in the Last Year: No       Review of patient's allergies indicates:  No Known Allergies   Medications Ordered Prior to Encounter[1]   Review of Systems   Constitutional:  Negative for unexpected weight change.   HENT:  Negative for mouth sores.    Eyes:  Negative for visual disturbance.   Respiratory:  Negative for cough and shortness of breath.    Cardiovascular:  Negative for chest pain.   Gastrointestinal:  Negative for abdominal pain and diarrhea.        +abdominal discomfort   Genitourinary:  Negative for frequency.   Musculoskeletal:  Negative for back pain.   Integumentary:  Negative for rash.   Neurological:  Negative for headaches.   Hematological:  Negative for adenopathy.   Psychiatric/Behavioral:  The patient is nervous/anxious.               Vitals:    04/29/25 0930   BP: 120/74   Pulse: 92   Resp: 14   Temp: 98.3 °F (36.8 °C)   TempSrc: Oral   SpO2: 100%   Weight: 80.6 kg (177 lb 11.2 oz)   Height: 5' 5" (1.651 m)        Physical Exam  Constitutional:       Appearance: Normal appearance.   HENT:      Head: Normocephalic and atraumatic.      Nose: Nose normal.      Mouth/Throat:      Mouth: Mucous membranes are moist.   Eyes:      Extraocular Movements: Extraocular movements intact.   Cardiovascular:      Rate and Rhythm: Normal rate and regular rhythm.   Pulmonary:      Effort: Pulmonary effort is normal.      Breath sounds: Normal breath sounds.   Abdominal:      General: Bowel sounds are normal.      Palpations: Abdomen is soft.   Musculoskeletal:      " Cervical back: Normal range of motion and neck supple.      Right lower leg: No edema.      Left lower leg: No edema.   Neurological:      General: No focal deficit present.      Mental Status: She is alert and oriented to person, place, and time.   Psychiatric:         Mood and Affect: Mood normal.         Behavior: Behavior normal.        No visits with results within 1 Day(s) from this visit.   Latest known visit with results is:   Office Visit on 04/14/2025   Component Date Value Ref Range Status    Beta 2 GP1 Ab IgG 04/14/2025 <9.4  <15.0 (Negative) SGU Final    Beta 2 GP1 Ab IgM 04/14/2025 <9.4  <15.0 (Negative) SMU Final       Test Performed by:  [facility]  [address] [place] [address]  : Terri Drummond Ph.D.; CLIA# [id number]    Cardiolipin IgG Quant 04/14/2025 2.6  <10 GPL U/mL Final      <10:   Negative   10-40: Weakly Positive   >40:   Positive    Cardiolipin IgA Quant 04/14/2025 0.6  <14 APL U/mL Final      <14:   Negative   14-20: Equivocal   >20:   Positive     In the case of equivocal results, it is recommended to retest the patient after 4-6 weeks    Cardiolipin IgM Quant 04/14/2025 2.2  <10 MPL U/mL Final      <10:   Negative   10-40: Weakly Positive   >40:   Positive    Protein S Ag, Free, P 04/14/2025 129  50 - 160 % Final    For patients in whom hereditary protein S deficiency is   strongly suspected and the free plasma protein S antigen   level is normal/ elevated, consideration should be given to   testing of protein S activity, SFX / Protein S Activity,   Plasma, for detecting (rare) type II protein S deficiency.     -------------------ADDITIONAL INFORMATION-------------------  This test has been modified from the 's   instructions. Its performance characteristics were   determined by [facility] in a manner consistent with   CLIA requirements. This test has not been cleared or   approved by the U.S. Food and  Drug Administration.     Test Performed by:  [place] [address]  : Terri Drummond Ph.D.; CLIA# [id number]    Protein S Activity, P 04/14/2025 115  50 - 150 % Final    Anticoagulants (for example oral direct thrombin inhibitors  like dabigatran, anti-Xa inhibitors like rivaroxaban,  apixaban or edoxaban), heparin levels greater than 1 U/mL,  inhibitors of the APTT test system (for example lupus  anticoagulant), inhibitors of bovine factor V (for example  antibodies that may arise in patients treated with certain  bovine topical thrombin preparations) may produce a falsely  normal or elevated protein S activity result.  Suggest   clinical correlation and if indicated consider repeat assay  of protein S activity and/or antigen in the absence of  anticoagulation therapy.     Test Performed by:  [place] [address]  : Terri Drummond Ph.D.; CLIA# [id number]    Protein C Activity, P 04/14/2025 150  70 - 150 % Final       -------------------ADDITIONAL INFORMATION-------------------  This test has been modified from the 's   instructions. Its performance characteristics were   determined by [facility] in a manner consistent with   CLIA requirements. This test has not been cleared or   approved by the U.S. Food and Drug Administration.     Test Performed by:  [place] [address]  : Terri Drummond Ph.D.; CLIA# [id number]    Antithrombin Activity, P 04/14/2025 94  80 - 130 % Final    Comment: Normal antithrombin (AT) activity (chromogenic method)   makes a congenital or acquired AT deficiency less likely.   Direct factor Xa inhibitor therapy (rivaroxaban (Xarelto),   apixaban (Eliquis), edoxaban (Savaysa)) may interfere with   the functional Xa based AT assay  and cause an   overestimation of AT activity thus potentially masking   diagnosis of AT deficiency.     Suggest clinical correlation and consider repeat AT testing   with Antithrombin Activity, w/ Reflex, P, remote from   direct factor Xa inhibitor therapy, if clinically indicated.     -------------------ADDITIONAL INFORMATION-------------------  This test has been modified from the 's   instructions. Its performance characteristics were   determined by [facility] in a manner consistent with   CLIA requirements. This test has not been cleared or   approved by the U.S. Food and Drug Administration.     Test Performed by:  [facility] [place] [address]  : Terri Drummond Ph.D.; CLIA# [id number]    Factor V Leiden (R506Q) Mutation 04/14/2025 Negative  Negative Final    F5DNA Interpretation 04/14/2025 SEE COMMENTS   Final    Comment: This individual DOES NOT have the factor V Leiden F5   c.1601G>A; p.Lum418Pge (legacy numbering [id number])   variant. Although the factor V Leiden variant is absent,   the individual may have other genetic and environmental   risk factors for venous thromboembolism. If indicated,   consider genetic consultation and counseling for this   individual and family members regarding laboratory testing.     -------------------ADDITIONAL INFORMATION-------------------  This test uses TaqMan Genotyping chemistry to amplify and   detect specific single nucleotide polymorphisms in purified   genomic DNA.     DISCLAIMER  Discrepancy between the activated protein C resistance   assay and the DNA based F5 c.1601 G>A. p.[id number] assay   may be observed in patients receiving allogenic stem cell   transplants or liver transplants.  This test was developed and its performance characteristics   determined by [facility] in a manner consistent with CLIA   requirements. This test has not been  cleared or                            approved by   the U.S. Food and Drug Administration.    F5DNA Reviewed By 04/14/2025 SEE COMMENTS   Final    RESULT: Bert Kwan M.D., Ph.D.     Test Performed by:  [address] [address]  : Terri Drummond Ph.D.; CLIA# [id number]    Prothrombin [id number] Mutation 04/14/2025 Negative  Negative Final    PTNT Interpretation 04/14/2025 SEE COMMENTS   Final    Comment: This individual DOES NOT have the Prothrombin F2 c.*97G>A   (legacy numbering [id number]) variant. Although the   Prothrombin (F2 c.*97G>A) variant is absent, this   individual may have other genetic and environmental risk   factors for thrombosis. If indicated, consider genetic   consultation and counseling for this individual and   potentially affected family members regarding laboratory   testing.     -------------------ADDITIONAL INFORMATION-------------------  This test uses TaqMan Genotyping chemistry to amplify and   detect specific single nucleotide polymorphisms in purified   genomic DNA.     DISCLAIMER  Patients receiving allogenic stem cell transplants prior to   having blood drawn for DNA based testing may have false   normal or abnormal results depending on the genotype of the   stem cell donor.  This test was developed and its performance characteristics   determined by [facility] in a manner consistent with CLIA   requirements. This test has not been cleared or approved by                              the U.S. Food and Drug Administration.    PTNT Reviewed By 04/14/2025 SEE COMMENTS   Final    RESULT: Bert Kwan M.D., Ph.D.     Test Performed by:  [address] [address]  : Terri Drummond Ph.D.; CLIA# [id number]    WBC 04/14/2025 5.84  4.50 - 11.50 x10(3)/mcL Final    RBC 04/14/2025 3.86 (L)  4.20 - 5.40 x10(6)/mcL Final    Hgb 04/14/2025 12.1   12.0 - 16.0 g/dL Final    Hct 04/14/2025 35.9 (L)  37.0 - 47.0 % Final    MCV 04/14/2025 93.0  80.0 - 94.0 fL Final    MCH 04/14/2025 31.3 (H)  27.0 - 31.0 pg Final    MCHC 04/14/2025 33.7  33.0 - 36.0 g/dL Final    RDW 04/14/2025 12.1  11.5 - 17.0 % Final    Platelet 04/14/2025 333  130 - 400 x10(3)/mcL Final    MPV 04/14/2025 9.0  7.4 - 10.4 fL Final    Neut % 04/14/2025 49.5  % Final    Lymph % 04/14/2025 39.7  % Final    Mono % 04/14/2025 6.3  % Final    Eos % 04/14/2025 3.4  % Final    Basophil % 04/14/2025 0.9  % Final    Imm Grans % 04/14/2025 0.2  % Final    Neut # 04/14/2025 2.89  2.1 - 9.2 x10(3)/mcL Final    Lymph # 04/14/2025 2.32  0.6 - 4.6 x10(3)/mcL Final    Mono # 04/14/2025 0.37  0.1 - 1.3 x10(3)/mcL Final    Eos # 04/14/2025 0.20  0 - 0.9 x10(3)/mcL Final    Baso # 04/14/2025 0.05  <=0.2 x10(3)/mcL Final    Imm Gran # 04/14/2025 0.01  0.00 - 0.04 x10(3)/mcL Final    DRVV Scr Ratio 04/14/2025 0.95   Final    DRVV Conf Ratio 04/14/2025 0.91   Final    DRVV Norm Ratio 04/14/2025 1.04  0.00 - 1.19 Final    Lupus Anticoag ST Calc 04/14/2025 3.8  0.0 - 7.9 seconds Final    Lupus Anticoagulant Interp 04/14/2025 Interpretative Report for Lupus Anticoagulant    Interpretation:  Lupus anticoagulant not detected.    Alejo Hylton M.D.    Final    Thrombin Time 04/14/2025 18  0 - 22 seconds Final           Assessment:       1. History of pulmonary embolism    2. Portal vein thrombosis        Plan:       Patient with chronic PVT, seen on scans since at least 2014.  Also with h/o PE following cholecystectomy in 2016, treated with 3 months of presumed Xarelto.     Unclear etiology of PVT, referred by hepatology for hypercoagulable work-up which is all negative.  She has no findings on CBC concerning for any MPN.    Suspect the thrombosis could have possibly been related to birth control and hormones that she was on for a prolonged period of time for her ongoing endometriosis.     I don't feel strongly at  this time that she needs lifelong anticoagulation since her PE was provoked by surgery and since the PVT is chronic.    However, she will need prolonged prophylaxis with Lovenox for any future surgeries.    RTC PRN.     All questions answered at this time.    Radha Dangelo MD                               [1]   Current Outpatient Medications on File Prior to Visit   Medication Sig Dispense Refill    indomethacin (INDOCIN) 25 MG capsule Take 25 mg by mouth 3 (three) times daily.      ondansetron (ZOFRAN) 4 MG tablet Take 1 tablet (4 mg total) by mouth every 6 (six) hours. 12 tablet 0    ZOLMitriptan (ZOMIG) 5 MG tablet       azelastine (ASTELIN) 137 mcg (0.1 %) nasal spray 1 spray (137 mcg total) by Nasal route 2 (two) times daily. (Patient not taking: Reported on 4/29/2025) 90 mL 3    oxyCODONE-acetaminophen (PERCOCET) 5-325 mg per tablet Take 1 tablet by mouth every 4 (four) hours as needed for Pain. (Patient not taking: Reported on 4/29/2025) 20 tablet 0    tamsulosin (FLOMAX) 0.4 mg Cap Take 1 capsule (0.4 mg total) by mouth once daily. for 7 days (Patient not taking: Reported on 4/29/2025) 7 capsule 0     No current facility-administered medications on file prior to visit.

## 2025-09-02 ENCOUNTER — TELEPHONE (OUTPATIENT)
Dept: INTERVENTIONAL RADIOLOGY/VASCULAR | Facility: CLINIC | Age: 40
End: 2025-09-02
Payer: COMMERCIAL

## 2025-09-02 ENCOUNTER — OFFICE VISIT (OUTPATIENT)
Dept: HEPATOLOGY | Facility: CLINIC | Age: 40
End: 2025-09-02
Payer: COMMERCIAL

## 2025-09-02 DIAGNOSIS — I81 CAVERNOUS TRANSFORMATION OF PORTAL VEIN: Primary | ICD-10-CM

## 2025-09-02 DIAGNOSIS — R63.4 UNINTENTIONAL WEIGHT LOSS: ICD-10-CM

## 2025-09-02 DIAGNOSIS — I81 PORTAL VEIN THROMBOSIS: ICD-10-CM

## 2025-09-02 DIAGNOSIS — R93.2 ABNORMAL FINDING ON IMAGING OF LIVER: ICD-10-CM

## 2025-09-02 DIAGNOSIS — R10.11 RUQ ABDOMINAL PAIN: ICD-10-CM

## 2025-09-02 DIAGNOSIS — I81 CAVERNOUS TRANSFORMATION OF PORTAL VEIN: ICD-10-CM

## 2025-09-02 PROCEDURE — 98006 SYNCH AUDIO-VIDEO EST MOD 30: CPT | Mod: 95,,, | Performed by: INTERNAL MEDICINE

## 2025-09-02 PROCEDURE — 1160F RVW MEDS BY RX/DR IN RCRD: CPT | Mod: CPTII,95,, | Performed by: INTERNAL MEDICINE

## 2025-09-02 PROCEDURE — 1159F MED LIST DOCD IN RCRD: CPT | Mod: CPTII,95,, | Performed by: INTERNAL MEDICINE
